# Patient Record
Sex: FEMALE | Race: WHITE | Employment: FULL TIME | URBAN - METROPOLITAN AREA
[De-identification: names, ages, dates, MRNs, and addresses within clinical notes are randomized per-mention and may not be internally consistent; named-entity substitution may affect disease eponyms.]

---

## 2017-04-17 ENCOUNTER — HOSPITAL ENCOUNTER (OUTPATIENT)
Dept: PHYSICAL THERAPY | Age: 42
Discharge: HOME | End: 2017-04-17
Attending: INTERNAL MEDICINE
Payer: COMMERCIAL

## 2017-04-17 DIAGNOSIS — M54.2 NECK PAIN: ICD-10-CM

## 2017-04-17 PROCEDURE — 97161 PT EVAL LOW COMPLEX 20 MIN: CPT

## 2017-04-17 PROCEDURE — 97110 THERAPEUTIC EXERCISES: CPT

## 2017-04-17 NOTE — PROGRESS NOTES
Mari Mario  : [facility] at [address] [place] [address]  [phone]   [phone]       OUTPATIENT PHYSICAL THERAPY:Initial Assessment and Daily Note 2017    ICD-10: Treatment Diagnosis: Cervicalgia (M54.2)   Precautions/Allergies:   Zithromax [azithromycin]   Fall Risk Score: 1 (? 5 = High Risk)  MD Orders: Eval and Treat MEDICAL/REFERRING DIAGNOSIS:  Neck pain [M54.2]   DATE OF ONSET: four weeks ago  REFERRING PHYSICIAN: Sukhdeep Sanches MD  RETURN PHYSICIAN APPOINTMENT: TBD by patient      INITIAL ASSESSMENT:  Ms. Mari Mario has attended 1 physical therapy session including initial evaluation. Mari Mario presents with S/S of increased pain, decreased ROM, decreased strength, decreased functional tolerance consistent with S/S of cervical pain. Mari Mario will benefit from home exercise program, therapeutic and postural strengthening exercises, manual therapeutic techniques (ie. Distraction, SOR, myofascial release/soft tissue mobilization) as appropriate to address Hortencia Aranda's current condition. Mari Mario will benefit from skilled PT (medically necessary) to address above deficits affecting participation in basic ADLs and overall functional tolerance. PROBLEM LIST (Impacting functional limitations):  1. Decreased Strength  2. Decreased ADL/Functional Activities  3. Decreased Transfer Abilities  4. Decreased Balance  5. Increased Pain  6. Decreased Activity Tolerance  7. Decreased Work Simplification/Energy Conservation Techniques  8. Decreased Flexibility/Joint Mobility  9. Decreased Knowledge of Precautions  10. Decreased [place] with Home Exercise Program INTERVENTIONS PLANNED:  1. Bed Mobility  2. Cold  3. Cryotherapy  4. Heat  5. Home Exercise Program (HEP)  6. Manual Therapy  7. Neuromuscular Re-education/Strengthening  8. Range of Motion (ROM)  9. Therapeutic Activites  10.  Therapeutic Exercise/Strengthening  11. Transfer Training   TREATMENT PLAN:  Effective Dates: 4/17/2017 TO 7/17/2017. Frequency/Duration: 2 times a week for 6 weeks  GOALS: (Goals have been discussed and agreed upon with patient.)  SHORT-TERM FUNCTIONAL GOALS: Time Frame: 4 weeks  1. Karly Toney will report <=4/10 pain to cervical spine with performance of functional spinal mobility and rotation. 2.  Karly Toney will demonstrate improved NDI score, indicating spinal improvement from 15/50 to 10/50 affecting minimal to no difficulty with performance of cervical mobility and strengthening. 3.  Karly Toney to be independent with initial HEP for cervical region and UE's.   4.  Karly Toney will improve ROM to >=90% to assist with improved function during instrumental activities of daily living. [facility] will improve MMT to >=4+/5 to all UE strengthening to return to PLOF and improve functional tolerance. DISCHARGE GOALS: Time Frame: 8 weeks  1. Karly Toney will report <=2/10 pain to cervical spine with performance of functional spinal mobility and rotation and minimal to no difficulty with such tasks. 2. Karly Toney will demonstrate improved NDI score, indicating spinal improvement from 10/50 to <=5/50 affecting minimal to no difficulty with performance of cervical mobility and strengthening. 3. Kalry Toney to be independent with advanced HEP for cervical region and UE's. Rehabilitation Potential For Stated Goals: Good  Regarding Hortencia Aranda's therapy, I certify that the treatment plan above will be carried out by a therapist or under their direction. Thank you for this referral,  Dima Astorga PT     Referring Physician Signature: Wesley Metcalf MD              Date                    HISTORY:   History of Present Injury/Illness (Reason for Referral):  Mrs. Renetta Rand reports onset of neck pain after waking about a month ago.   She reports that her pain has persisted since that time and led to radiating pain in the left >right shoulder blade, bilateral upper traps, and has led to nightly sleep disturbance. Her symptoms radiate to the left thumb and first two fingers and her neck pain is increased with continued work duties. Her goals are to improve her neck pain and progress to prior level of function at work and to decrease her sleep disturbance. Past Medical History/Comorbidities:   Ms. Milind Lemon  has a past medical history of Abscess in lower abdominal wall (10/25/2013); Arthritis; asthma (10/25/2013); asthma (10/25/2013); Diabetes ([address] (15years of age); Folliculitis (76/99/3486); GERD (gastroesophageal reflux disease); HTN (hypertension) (10/25/2013); hyperlipidemia (10/25/2013); Insomnia (10/25/2013); Laryngitis (10/25/2013); Left knee pain (10/25/2013); Morbid obesity ([address] Nausea & vomiting; Otitis media (10/25/2013); Palpitation (10/25/2013); Paronychia (10/25/2013); RA (rheumatoid arthritis) ([address] Rhinitis (10/25/2013); Shoulder pain, probably musculokeletal (10/25/2013); Thyroid disease; Unspecified adverse effect of anesthesia; Unspecified sleep apnea; Upper respiratory infection (10/25/2013); and Uterine fibroid. She also has no past medical history of Aneurysm ([address] Arrhythmia; Autoimmune disease ([address] CAD (coronary artery disease); Cancer ([address] Chronic kidney disease; Chronic pain; Coagulation defects; COPD; Difficult intubation; Heart failure ([address] Liver disease; Malignant hyperthermia due to anesthesia; Other ill-defined conditions; Pseudocholinesterase deficiency; Psychiatric disorder; PUD (peptic ulcer disease); Seizures ([address] Stroke [facility]); or Thromboembolus ([address] Ms. Milind Lemon  has a past surgical history that includes cholecystectomy; heent; and sinus surgery proc unlisted. Social History/Living Environment:     lives with  in private residence.    Prior Level of Function/Work/Activity:  Works as a   at [employer]    Current Medications:    Current Outpatient Prescriptions:     nabumetone (RELAFEN) 500 mg tablet, Take 1 Tab by mouth two (2) times a day., Disp: 60 Tab, Rfl: 0    cyclobenzaprine (FLEXERIL) 10 mg tablet, Take 1 Tab by mouth three (3) times daily as needed for Muscle Spasm(s). , Disp: 30 Tab, Rfl: 0    dorzolamide-timolol (COSOPT) 22.3-6.8 mg/mL ophthalmic solution, Administer 1 Drop to both eyes two (2) times a day., Disp: , Rfl:     montelukast (SINGULAIR) 10 mg tablet, TAKE ONE TABLET BY MOUTH DAILY (IN THE EVENING). , Disp: 90 Tab, Rfl: 0    insulin lispro (HUMALOG) 100 unit/mL injection, USE WITH INSULIN PUMP AS DIRECTED., Disp: 150 mL, Rfl: 0    nystatin (MYCOSTATIN) powder, Apply as directed, Disp: 60 Bottle, Rfl: 3    irbesartan (AVAPRO) 150 mg tablet, Take 1 Tab by mouth daily. , Disp: 90 Tab, Rfl: 3    atorvastatin (LIPITOR) 40 mg tablet, Take 1 Tab by mouth daily. , Disp: 90 Tab, Rfl: 3    lansoprazole (PREVACID) 30 mg capsule, Take 1 Cap by mouth Daily (before breakfast). , Disp: 90 Cap, Rfl: 3    traZODone (DESYREL) 50 mg tablet, Take 1 Tab by mouth nightly., Disp: 90 Tab, Rfl: 3    levothyroxine (SYNTHROID) 200 mcg tablet, Take 1 Tab by mouth Daily (before breakfast). , Disp: 90 Tab, Rfl: 3    levothyroxine (SYNTHROID) 50 mcg tablet, Take 1 Tab by mouth Daily (before breakfast). , Disp: 90 Tab, Rfl: 3    glucose blood VI test strips (ACCU-CHEK AMARA PLUS TEST STRP) strip, Check BS 10 Times Daily  DxE11.9, use with accu-check combo pump, Disp: 300 Strip, Rfl: 11    progesterone (PROMETRIUM) 100 mg capsule, Take 100 mg by mouth daily. , Disp: , Rfl:     fluticasone (FLONASE) 50 mcg/actuation nasal spray, 1 puff in each nostril twice daily, Disp: 1 Bottle, Rfl: 5    aspirin (ASPIRIN) 325 mg tablet, Take 325 mg by mouth daily. , Disp: , Rfl:     cyanocobalamin (VITAMIN B-12) 1,000 mcg tablet, Take 3,000 mcg by mouth daily. , Disp: , Rfl:     cholecalciferol, vitamin d3, 10,000 unit cap, Take  by mouth daily. , Disp: , Rfl:     calcium 600 mg cap, Take  by mouth daily. , Disp: , Rfl:     MULTIVITAMIN PO, Take  by mouth daily. , Disp: , Rfl:     fexofenadine (ALLEGRA) 180 mg tablet, Take 180 mg by mouth daily. Take / use AM day of surgery with a sip of water per anesthesia protocols. , Disp: , Rfl:      Date Last Reviewed:  4/17/2017   Number of Personal Factors/Comorbidities that affect the Plan of Care: 0: LOW COMPLEXITY   EXAMINATION:   Observation/Orthostatic Postural Assessment: Forward head posture, bilateral rounded shoulders, increased cervical lordosis  Palpation:          Tenderness to palpation in the cervical paraspinals, upper trapezius, and levators  ROM:    Joint: Eval Date: 4/17/2017 Re-Assess Date: Re-Assess Date:         Cervical AROM      Flexion (% of normal): 50 with tightness     Extension (% of normal): 50 with pain     L sidebending (% of normal): 50 with pain at end range     R sidebending (% of normal): 75 with tightness     L rotation (% of normal): 50 with pain at end range     R rotation (% of normal): 75 with tightness                 Pain at end-range or with AROM? Yes/No yes     Any pain with overpressure? Yes/No yes       Strength:    Joint: Eval Date:  Re-Assess Date:  Re-Assess Date:     RIGHT LEFT RIGHT LEFT RIGHT LEFT   Shoulder flexion:  5/5 4/5       Shoulder extension: 5/5 5/5       Shoulder abduction: 5/5 4/5       Shoulder IR: 5/5 5/5       Shoulder ER: 5/5 4/5       Elbow flexion: 5/5 4/5       Elbow extension: 5/5 4/5       Wrist flexion/extension: 5/5 4/5           Special Tests: Assessed @ Initial Visit    Spurling's Test: Positive     Neurological Screen: Radiating symptoms? Yes/No     Functional Mobility:  Assessed @ Initial Visit   Balance:  Assessed @ Initial Visit      Body Structures Involved:  1. Nerves  2. Bones  3. Joints  4. Muscles  5. Ligaments Body Functions Affected:  1. Sensory/Pain  2. Neuromusculoskeletal  3.  Movement Related Activities and Participation Affected:  1. General Tasks and Demands  2. Communication  3. Mobility  4. Self Care  5. Interpersonal Interactions and Relationships  6. Community, Social and [place]   Number of elements that affect the Plan of Care: 3: MODERATE COMPLEXITY   CLINICAL PRESENTATION:   Presentation: Stable and uncomplicated: LOW COMPLEXITY   CLINICAL DECISION MAKING:   Outcome Measure: Tool Used: Neck Disability Index (NDI)  Score:  Initial: 15/50  Most Recent: X/50 (Date: -- )   Interpretation of Score: The Neck Disability Index is a revised form of the Oswestry Low Back Pain Index and is designed to measure the activities of daily living in person's with neck pain. Each section is scored on a 0-5 scale, 5 representing the greatest disability. The scores of each section are added together for a total score of 50. Score 0 1-10 11-20 21-30 31-40 41-49 50   Modifier CH CI CJ CK CL CM CN       Medical Necessity:   · Skilled intervention continues to be required due to address above deficits affecting participation in basic ADLs and overall functional tolerance. Reason for Services/Other Comments:  · Patient continues to require skilled intervention due to address above deficits affecting participation in basic ADLs and overall functional tolerance. Use of outcome tool(s) and clinical judgement create a POC that gives a: Clear prediction of patient's progress: LOW COMPLEXITY   TREATMENT:   (In addition to Assessment/Re-Assessment sessions the following treatments were rendered)  THERAPEUTIC EXERCISE: (20 minutes):  Exercises per grid below to improve mobility, strength, balance and coordination. Required minimal visual and verbal cues to promote proper body alignment and promote proper body posture. Progressed resistance, range, repetitions and complexity of movement as indicated.      Date:  4/17/2017 Date:   Date:     Activity/Exercise Parameters Parameters Parameters   Supine chin tucks x5' Doorway pec stretch x5'     Sidelying trunk rotation x5'     Patient education on sleep position with set up x5'                             MANUAL THERAPY: (0 minutes): Joint mobilization, Soft tissue mobilization and Manipulation was utilized and necessary because of the patient's restricted joint motion, painful spasm, loss of articular motion and restricted motion of soft tissue. MODALITIES: ( minutes):    (Used abbreviations: MET - muscle energy technique; PNF - proprioceptive neuromuscular facilitation; NMR - neuromuscular re-education; AP - anterior to posterior; PA - posterior to anterior)    Pre-Treatment Assessment:See Patient history  Treatment/Session Assessment:Patient had improved pain with ROM. · Pain/ Symptoms: Initial:   7 Post Session:  4 ·   Compliance with Program/Exercises: Will assess as treatment progresses. · Recommendations/Intent for next treatment session: \"Next visit will focus on advancements to more challenging activities and reduction in assistance provided\".   Total Treatment Duration:  PT Patient Time In/Time Out  Time In: 1400  Time Out: [facility],

## 2017-04-24 ENCOUNTER — HOSPITAL ENCOUNTER (OUTPATIENT)
Dept: PHYSICAL THERAPY | Age: 42
Discharge: HOME | End: 2017-04-24
Attending: INTERNAL MEDICINE
Payer: COMMERCIAL

## 2017-04-24 PROCEDURE — 97110 THERAPEUTIC EXERCISES: CPT

## 2017-04-24 PROCEDURE — 97140 MANUAL THERAPY 1/> REGIONS: CPT

## 2017-04-24 NOTE — PROGRESS NOTES
Kaycee Davis  : 1975 [facility] [address]  [phone]   [phone]       OUTPATIENT PHYSICAL THERAPY: Daily Note 2017    ICD-10: Treatment Diagnosis: Cervicalgia (M54.2)   Precautions/Allergies:   Zithromax [azithromycin]   Fall Risk Score: 1 (? 5 = High Risk)  MD Orders: Eval and Treat MEDICAL/REFERRING DIAGNOSIS:  Neck pain [M54.2]   DATE OF ONSET: four weeks ago  REFERRING PHYSICIAN: Angelica Haile MD  RETURN PHYSICIAN APPOINTMENT: TBD by patient      INITIAL ASSESSMENT:  Ms. Kaycee Davis has attended 1 physical therapy session including initial evaluation. Kaycee Davis presents with S/S of increased pain, decreased ROM, decreased strength, decreased functional tolerance consistent with S/S of cervical pain. Kaycee Davis will benefit from home exercise program, therapeutic and postural strengthening exercises, manual therapeutic techniques (ie. Distraction, SOR, myofascial release/soft tissue mobilization) as appropriate to address Hortencia Aranda's current condition. Kaycee Davis will benefit from skilled PT (medically necessary) to address above deficits affecting participation in basic ADLs and overall functional tolerance. PROBLEM LIST (Impacting functional limitations):  1. Decreased Strength  2. Decreased ADL/Functional Activities  3. Decreased Transfer Abilities  4. Decreased Balance  5. Increased Pain  6. Decreased Activity Tolerance  7. Decreased Work Simplification/Energy Conservation Techniques  8. Decreased Flexibility/Joint Mobility  9. Decreased Knowledge of Precautions  10. Decreased [place] with Home Exercise Program INTERVENTIONS PLANNED:  1. Bed Mobility  2. Cold  3. Cryotherapy  4. Heat  5. Home Exercise Program (HEP)  6. Manual Therapy  7. Neuromuscular Re-education/Strengthening  8. Range of Motion (ROM)  9. Therapeutic Activites  10.  Therapeutic Exercise/Strengthening  11. Transfer Training   TREATMENT PLAN:  Effective Dates: 4/17/2017 TO 7/17/2017. Frequency/Duration: 2 times a week for 6 weeks  GOALS: (Goals have been discussed and agreed upon with patient.)  SHORT-TERM FUNCTIONAL GOALS: Time Frame: 4 weeks  1. Mk Vazquez will report <=4/10 pain to cervical spine with performance of functional spinal mobility and rotation. 2.  Mk Colen will demonstrate improved NDI score, indicating spinal improvement from 15/50 to 10/50 affecting minimal to no difficulty with performance of cervical mobility and strengthening. 3.  Job Kelseyn to be independent with initial HEP for cervical region and UE's.   4.  Mk Colen will improve ROM to >=90% to assist with improved function during instrumental activities of daily living. [facility] will improve MMT to >=4+/5 to all UE strengthening to return to PLOF and improve functional tolerance. DISCHARGE GOALS: Time Frame: 8 weeks  1. Mk Colen will report <=2/10 pain to cervical spine with performance of functional spinal mobility and rotation and minimal to no difficulty with such tasks. 2. Mk Colen will demonstrate improved NDI score, indicating spinal improvement from 10/50 to <=5/50 affecting minimal to no difficulty with performance of cervical mobility and strengthening. 3. Mk Colen to be independent with advanced HEP for cervical region and UE's. Rehabilitation Potential For Stated Goals: Good  Regarding Hortencia Aranda's therapy, I certify that the treatment plan above will be carried out by a therapist or under their direction. Thank you for this referral,  Lc Fall PT     Referring Physician Signature: Kd Pappas MD              Date                    HISTORY:   History of Present Injury/Illness (Reason for Referral):  Mrs. Alvarado reports onset of neck pain after waking about a month ago.   She reports that her pain has persisted since that time and led to radiating pain in the left >right shoulder blade, bilateral upper traps, and has led to nightly sleep disturbance. Her symptoms radiate to the left thumb and first two fingers and her neck pain is increased with continued work duties. Her goals are to improve her neck pain and progress to prior level of function at work and to decrease her sleep disturbance. Past Medical History/Comorbidities:   Ms. Jaspreet Hay  has a past medical history of Abscess in lower abdominal wall (10/25/2013); Arthritis; asthma (10/25/2013); asthma (10/25/2013); Diabetes ([address] (15years of age); Folliculitis (94/49/8581); GERD (gastroesophageal reflux disease); HTN (hypertension) (10/25/2013); hyperlipidemia (10/25/2013); Insomnia (10/25/2013); Laryngitis (10/25/2013); Left knee pain (10/25/2013); Morbid obesity ([address] Nausea & vomiting; Otitis media (10/25/2013); Palpitation (10/25/2013); Paronychia (10/25/2013); RA (rheumatoid arthritis) ([address] Rhinitis (10/25/2013); Shoulder pain, probably musculokeletal (10/25/2013); Thyroid disease; Unspecified adverse effect of anesthesia; Unspecified sleep apnea; Upper respiratory infection (10/25/2013); and Uterine fibroid. She also has no past medical history of Aneurysm ([address] Arrhythmia; Autoimmune disease ([address] CAD (coronary artery disease); Cancer ([address] Chronic kidney disease; Chronic pain; Coagulation defects; COPD; Difficult intubation; Heart failure ([address] Liver disease; Malignant hyperthermia due to anesthesia; Other ill-defined conditions; Pseudocholinesterase deficiency; Psychiatric disorder; PUD (peptic ulcer disease); Seizures ([address] Stroke [facility]); or Thromboembolus ([address] Ms. Jaspreet Hay  has a past surgical history that includes cholecystectomy; heent; and sinus surgery proc unlisted. Social History/Living Environment:     lives with  in private residence.    Prior Level of Function/Work/Activity:  Works as a   at [employer]    Current Medications:    Current Outpatient Prescriptions:     nabumetone (RELAFEN) 500 mg tablet, Take 1 Tab by mouth two (2) times a day., Disp: 60 Tab, Rfl: 0    cyclobenzaprine (FLEXERIL) 10 mg tablet, Take 1 Tab by mouth three (3) times daily as needed for Muscle Spasm(s). , Disp: 30 Tab, Rfl: 0    dorzolamide-timolol (COSOPT) 22.3-6.8 mg/mL ophthalmic solution, Administer 1 Drop to both eyes two (2) times a day., Disp: , Rfl:     montelukast (SINGULAIR) 10 mg tablet, TAKE ONE TABLET BY MOUTH DAILY (IN THE EVENING). , Disp: 90 Tab, Rfl: 0    insulin lispro (HUMALOG) 100 unit/mL injection, USE WITH INSULIN PUMP AS DIRECTED., Disp: 150 mL, Rfl: 0    nystatin (MYCOSTATIN) powder, Apply as directed, Disp: 60 Bottle, Rfl: 3    irbesartan (AVAPRO) 150 mg tablet, Take 1 Tab by mouth daily. , Disp: 90 Tab, Rfl: 3    atorvastatin (LIPITOR) 40 mg tablet, Take 1 Tab by mouth daily. , Disp: 90 Tab, Rfl: 3    lansoprazole (PREVACID) 30 mg capsule, Take 1 Cap by mouth Daily (before breakfast). , Disp: 90 Cap, Rfl: 3    traZODone (DESYREL) 50 mg tablet, Take 1 Tab by mouth nightly., Disp: 90 Tab, Rfl: 3    levothyroxine (SYNTHROID) 200 mcg tablet, Take 1 Tab by mouth Daily (before breakfast). , Disp: 90 Tab, Rfl: 3    levothyroxine (SYNTHROID) 50 mcg tablet, Take 1 Tab by mouth Daily (before breakfast). , Disp: 90 Tab, Rfl: 3    glucose blood VI test strips (ACCU-CHEK AMARA PLUS TEST STRP) strip, Check BS 10 Times Daily  DxE11.9, use with accu-check combo pump, Disp: 300 Strip, Rfl: 11    progesterone (PROMETRIUM) 100 mg capsule, Take 100 mg by mouth daily. , Disp: , Rfl:     fluticasone (FLONASE) 50 mcg/actuation nasal spray, 1 puff in each nostril twice daily, Disp: 1 Bottle, Rfl: 5    aspirin (ASPIRIN) 325 mg tablet, Take 325 mg by mouth daily. , Disp: , Rfl:     cyanocobalamin (VITAMIN B-12) 1,000 mcg tablet, Take 3,000 mcg by mouth daily. , Disp: , Rfl:     cholecalciferol, vitamin d3, 10,000 unit cap, Take  by mouth daily. , Disp: , Rfl:     calcium 600 mg cap, Take  by mouth daily. , Disp: , Rfl:     MULTIVITAMIN PO, Take  by mouth daily. , Disp: , Rfl:     fexofenadine (ALLEGRA) 180 mg tablet, Take 180 mg by mouth daily. Take / use AM day of surgery with a sip of water per anesthesia protocols. , Disp: , Rfl:      Date Last Reviewed:  4/24/2017   Number of Personal Factors/Comorbidities that affect the Plan of Care: 0: LOW COMPLEXITY   EXAMINATION:   Observation/Orthostatic Postural Assessment: Forward head posture, bilateral rounded shoulders, increased cervical lordosis  Palpation:          Tenderness to palpation in the cervical paraspinals, upper trapezius, and levators  ROM:    Joint: Eval Date: 4/17/2017 Re-Assess Date: Re-Assess Date:         Cervical AROM      Flexion (% of normal): 50 with tightness     Extension (% of normal): 50 with pain     L sidebending (% of normal): 50 with pain at end range     R sidebending (% of normal): 75 with tightness     L rotation (% of normal): 50 with pain at end range     R rotation (% of normal): 75 with tightness                 Pain at end-range or with AROM? Yes/No yes     Any pain with overpressure? Yes/No yes       Strength:    Joint: Eval Date:  Re-Assess Date:  Re-Assess Date:     RIGHT LEFT RIGHT LEFT RIGHT LEFT   Shoulder flexion:  5/5 4/5       Shoulder extension: 5/5 5/5       Shoulder abduction: 5/5 4/5       Shoulder IR: 5/5 5/5       Shoulder ER: 5/5 4/5       Elbow flexion: 5/5 4/5       Elbow extension: 5/5 4/5       Wrist flexion/extension: 5/5 4/5           Special Tests: Assessed @ Initial Visit    Spurling's Test: Positive     Neurological Screen: Radiating symptoms? Yes/No     Functional Mobility:  Assessed @ Initial Visit   Balance:  Assessed @ Initial Visit      Body Structures Involved:  1. Nerves  2. Bones  3. Joints  4. Muscles  5. Ligaments Body Functions Affected:  1. Sensory/Pain  2. Neuromusculoskeletal  3.  Movement Related Activities and Participation Affected:  1. General Tasks and Demands  2. Communication  3. Mobility  4. Self Care  5. Interpersonal Interactions and Relationships  6. Community, Social and [place]   Number of elements that affect the Plan of Care: 3: MODERATE COMPLEXITY   CLINICAL PRESENTATION:   Presentation: Stable and uncomplicated: LOW COMPLEXITY   CLINICAL DECISION MAKING:   Outcome Measure: Tool Used: Neck Disability Index (NDI)  Score:  Initial: 15/50  Most Recent: X/50 (Date: -- )   Interpretation of Score: The Neck Disability Index is a revised form of the Oswestry Low Back Pain Index and is designed to measure the activities of daily living in person's with neck pain. Each section is scored on a 0-5 scale, 5 representing the greatest disability. The scores of each section are added together for a total score of 50. Score 0 1-10 11-20 21-30 31-40 41-49 50   Modifier CH CI CJ CK CL CM CN       Medical Necessity:   · Skilled intervention continues to be required due to address above deficits affecting participation in basic ADLs and overall functional tolerance. Reason for Services/Other Comments:  · Patient continues to require skilled intervention due to address above deficits affecting participation in basic ADLs and overall functional tolerance. Use of outcome tool(s) and clinical judgement create a POC that gives a: Clear prediction of patient's progress: LOW COMPLEXITY   TREATMENT:   (In addition to Assessment/Re-Assessment sessions the following treatments were rendered)  THERAPEUTIC EXERCISE: (20 minutes):  Exercises per grid below to improve mobility, strength, balance and coordination. Required minimal visual and verbal cues to promote proper body alignment and promote proper body posture. Progressed resistance, range, repetitions and complexity of movement as indicated.      Date:  4/17/2017 Date:  4/24/2017 Date:     Activity/Exercise Parameters Parameters Parameters   Supine chin tucks x5' With cuff feedback x10'    Doorway pec stretch x5' x5'    Sidelying trunk rotation x5' x5'    Patient education on sleep position with set up x5'                             MANUAL THERAPY: (25 minutes): Joint mobilization, Soft tissue mobilization and Manipulation was utilized and necessary because of the patient's restricted joint motion, painful spasm, loss of articular motion and restricted motion of soft tissue. Soft tissue mobilization to bilateral upper traps and levators. Manual cervical distraction in supine. P/a mobilization to LC2-3, T1-2, T2-3, T3-4 grade III, IV-each level  MODALITIES: ( minutes):    (Used abbreviations: MET - muscle energy technique; PNF - proprioceptive neuromuscular facilitation; NMR - neuromuscular re-education; AP - anterior to posterior; PA - posterior to anterior)    Pre-Treatment Assessment: Reports improved symptoms when performing HEP. Treatment/Session Assessment:Patient had improved range and pain with cervical spine ROM after treatment. · Pain/ Symptoms: Initial:   5 Post Session:  3 ·   Compliance with Program/Exercises: Will assess as treatment progresses. · Recommendations/Intent for next treatment session: \"Next visit will focus on advancements to more challenging activities and reduction in assistance provided\".   Total Treatment Duration:  PT Patient Time In/Time Out  Time In: 1550  Time Out: [address], PT

## 2017-04-24 NOTE — PROGRESS NOTES
Ambulatory/Rehab Services H2 Model Falls Risk Assessment    Risk Factor Pts. ·   Confusion/Disorientation/Impulsivity  []    4 ·   Symptomatic Depression  []   2 ·   Altered Elimination  []   1 ·   Dizziness/Vertigo  []   1 ·   Gender (Male)  []   1 ·   Any administered antiepileptics (anticonvulsants):  []   2 ·   Any administered benzodiazepines:  []   1 ·   Visual Impairment (specify):  []   1 ·   Portable Oxygen Use  []   1 ·   Orthostatic ? BP  []   1 ·   History of Recent Falls (within 3 mos.)  []   5     Ability to Rise from Chair (choose one) Pts. ·   Ability to rise in a single movement  []   0 ·   Pushes up, successful in one attempt  [x]   1 ·   Multiple attempts, but successful  []   3 ·   Unable to rise without assistance  []   4   Total: (5 or greater = High Risk) 1     Falls Prevention Plan:   []                Physical Limitations to Exercise (specify):   []                Mobility Assistance Device (type):   []                Exercise/Equipment Adaptation (specify):    ©2010 [facility] of [address] Patent #[id number].  Federal Law prohibits the replication, distribution or use without written permission from [facility] ([facility])

## 2017-04-27 ENCOUNTER — HOSPITAL ENCOUNTER (OUTPATIENT)
Dept: PHYSICAL THERAPY | Age: 42
Discharge: HOME | End: 2017-04-27
Attending: INTERNAL MEDICINE
Payer: COMMERCIAL

## 2017-04-27 PROCEDURE — 97140 MANUAL THERAPY 1/> REGIONS: CPT

## 2017-04-27 PROCEDURE — 97110 THERAPEUTIC EXERCISES: CPT

## 2017-04-27 NOTE — PROGRESS NOTES
Cyn Newman  : [facility] [address]  [phone]   [phone]       OUTPATIENT PHYSICAL THERAPY: Daily Note 2017    ICD-10: Treatment Diagnosis: Cervicalgia (M54.2)   Precautions/Allergies:   Zithromax [azithromycin]   Fall Risk Score: 1 (? 5 = High Risk)  MD Orders: Eval and Treat MEDICAL/REFERRING DIAGNOSIS:  Neck pain [M54.2]   DATE OF ONSET: four weeks ago  REFERRING PHYSICIAN: Bernard Kaba MD  RETURN PHYSICIAN APPOINTMENT: TBD by patient      INITIAL ASSESSMENT:  Ms. Cyn Newman has attended 1 physical therapy session including initial evaluation. Cyn Newman presents with S/S of increased pain, decreased ROM, decreased strength, decreased functional tolerance consistent with S/S of cervical pain. Cyn Newman will benefit from home exercise program, therapeutic and postural strengthening exercises, manual therapeutic techniques (ie. Distraction, SOR, myofascial release/soft tissue mobilization) as appropriate to address Hortencia Aranda's current condition. Cyn Newman will benefit from skilled PT (medically necessary) to address above deficits affecting participation in basic ADLs and overall functional tolerance. PROBLEM LIST (Impacting functional limitations):  1. Decreased Strength  2. Decreased ADL/Functional Activities  3. Decreased Transfer Abilities  4. Decreased Balance  5. Increased Pain  6. Decreased Activity Tolerance  7. Decreased Work Simplification/Energy Conservation Techniques  8. Decreased Flexibility/Joint Mobility  9. Decreased Knowledge of Precautions  10. Decreased [place] with Home Exercise Program INTERVENTIONS PLANNED:  1. Bed Mobility  2. Cold  3. Cryotherapy  4. Heat  5. Home Exercise Program (HEP)  6. Manual Therapy  7. Neuromuscular Re-education/Strengthening  8. Range of Motion (ROM)  9. Therapeutic Activites  10.  Therapeutic Exercise/Strengthening  11. Transfer Training   TREATMENT PLAN:  Effective Dates: 4/17/2017 TO 7/17/2017. Frequency/Duration: 2 times a week for 6 weeks  GOALS: (Goals have been discussed and agreed upon with patient.)  SHORT-TERM FUNCTIONAL GOALS: Time Frame: 4 weeks  1. Justa Varela will report <=4/10 pain to cervical spine with performance of functional spinal mobility and rotation. 2.  Justa Varela will demonstrate improved NDI score, indicating spinal improvement from 15/50 to 10/50 affecting minimal to no difficulty with performance of cervical mobility and strengthening. 3.  Justa Varela to be independent with initial HEP for cervical region and UE's.   4.  Justa Varela will improve ROM to >=90% to assist with improved function during instrumental activities of daily living. [address] [facility] [address] will improve MMT to >=4+/5 to all UE strengthening to return to PLOF and improve functional tolerance. DISCHARGE GOALS: Time Frame: 8 weeks  1. Justa Varela will report <=2/10 pain to cervical spine with performance of functional spinal mobility and rotation and minimal to no difficulty with such tasks. 2. Justa Varela will demonstrate improved NDI score, indicating spinal improvement from 10/50 to <=5/50 affecting minimal to no difficulty with performance of cervical mobility and strengthening. 3. Justa Varela to be independent with advanced HEP for cervical region and UE's. Rehabilitation Potential For Stated Goals: Good  Regarding Hortencia Aranda's therapy, I certify that the treatment plan above will be carried out by a therapist or under their direction. Thank you for this referral,  [facility],      Referring Physician Signature: Jameson Naranjo MD              Date                    HISTORY:   History of Present Injury/Illness (Reason for Referral):  Mrs. Claudean Fries reports onset of neck pain after waking about a month ago.   She reports that her pain has persisted since that time and led to radiating pain in the left >right shoulder blade, bilateral upper traps, and has led to nightly sleep disturbance. Her symptoms radiate to the left thumb and first two fingers and her neck pain is increased with continued work duties. Her goals are to improve her neck pain and progress to prior level of function at work and to decrease her sleep disturbance. Past Medical History/Comorbidities:   Ms. Yuriy Leung  has a past medical history of Abscess in lower abdominal wall (10/25/2013); Arthritis; asthma (10/25/2013); asthma (10/25/2013); Diabetes ([address] (15years of age); Folliculitis (22/71/3235); GERD (gastroesophageal reflux disease); HTN (hypertension) (10/25/2013); hyperlipidemia (10/25/2013); Insomnia (10/25/2013); Laryngitis (10/25/2013); Left knee pain (10/25/2013); Morbid obesity ([address] Nausea & vomiting; Otitis media (10/25/2013); Palpitation (10/25/2013); Paronychia (10/25/2013); RA (rheumatoid arthritis) ([address] Rhinitis (10/25/2013); Shoulder pain, probably musculokeletal (10/25/2013); Thyroid disease; Unspecified adverse effect of anesthesia; Unspecified sleep apnea; Upper respiratory infection (10/25/2013); and Uterine fibroid. She also has no past medical history of Aneurysm ([address] Arrhythmia; Autoimmune disease ([address] CAD (coronary artery disease); Cancer ([address] Chronic kidney disease; Chronic pain; Coagulation defects; COPD; Difficult intubation; Heart failure ([address] Liver disease; Malignant hyperthermia due to anesthesia; Other ill-defined conditions; Pseudocholinesterase deficiency; Psychiatric disorder; PUD (peptic ulcer disease); Seizures ([address] Stroke [facility]); or Thromboembolus ([address] Ms. Yuriy Leung  has a past surgical history that includes cholecystectomy; heent; and sinus surgery proc unlisted. Social History/Living Environment:     lives with  in private residence.    Prior Level of Function/Work/Activity:  Works as a   at [employer]    Current Medications:    Current Outpatient Prescriptions:     nabumetone (RELAFEN) 500 mg tablet, Take 1 Tab by mouth two (2) times a day., Disp: 60 Tab, Rfl: 0    cyclobenzaprine (FLEXERIL) 10 mg tablet, Take 1 Tab by mouth three (3) times daily as needed for Muscle Spasm(s). , Disp: 30 Tab, Rfl: 0    dorzolamide-timolol (COSOPT) 22.3-6.8 mg/mL ophthalmic solution, Administer 1 Drop to both eyes two (2) times a day., Disp: , Rfl:     montelukast (SINGULAIR) 10 mg tablet, TAKE ONE TABLET BY MOUTH DAILY (IN THE EVENING). , Disp: 90 Tab, Rfl: 0    insulin lispro (HUMALOG) 100 unit/mL injection, USE WITH INSULIN PUMP AS DIRECTED., Disp: 150 mL, Rfl: 0    nystatin (MYCOSTATIN) powder, Apply as directed, Disp: 60 Bottle, Rfl: 3    irbesartan (AVAPRO) 150 mg tablet, Take 1 Tab by mouth daily. , Disp: 90 Tab, Rfl: 3    atorvastatin (LIPITOR) 40 mg tablet, Take 1 Tab by mouth daily. , Disp: 90 Tab, Rfl: 3    lansoprazole (PREVACID) 30 mg capsule, Take 1 Cap by mouth Daily (before breakfast). , Disp: 90 Cap, Rfl: 3    traZODone (DESYREL) 50 mg tablet, Take 1 Tab by mouth nightly., Disp: 90 Tab, Rfl: 3    levothyroxine (SYNTHROID) 200 mcg tablet, Take 1 Tab by mouth Daily (before breakfast). , Disp: 90 Tab, Rfl: 3    levothyroxine (SYNTHROID) 50 mcg tablet, Take 1 Tab by mouth Daily (before breakfast). , Disp: 90 Tab, Rfl: 3    glucose blood VI test strips (ACCU-CHEK AMARA PLUS TEST STRP) strip, Check BS 10 Times Daily  DxE11.9, use with accu-check combo pump, Disp: 300 Strip, Rfl: 11    progesterone (PROMETRIUM) 100 mg capsule, Take 100 mg by mouth daily. , Disp: , Rfl:     fluticasone (FLONASE) 50 mcg/actuation nasal spray, 1 puff in each nostril twice daily, Disp: 1 Bottle, Rfl: 5    aspirin (ASPIRIN) 325 mg tablet, Take 325 mg by mouth daily. , Disp: , Rfl:     cyanocobalamin (VITAMIN B-12) 1,000 mcg tablet, Take 3,000 mcg by mouth daily. , Disp: , Rfl:     cholecalciferol, vitamin d3, 10,000 unit cap, Take  by mouth daily. , Disp: , Rfl:     calcium 600 mg cap, Take  by mouth daily. , Disp: , Rfl:     MULTIVITAMIN PO, Take  by mouth daily. , Disp: , Rfl:     fexofenadine (ALLEGRA) 180 mg tablet, Take 180 mg by mouth daily. Take / use AM day of surgery with a sip of water per anesthesia protocols. , Disp: , Rfl:      Date Last Reviewed:  4/27/2017   Number of Personal Factors/Comorbidities that affect the Plan of Care: 0: LOW COMPLEXITY   EXAMINATION:   Observation/Orthostatic Postural Assessment: Forward head posture, bilateral rounded shoulders, increased cervical lordosis  Palpation:          Tenderness to palpation in the cervical paraspinals, upper trapezius, and levators  ROM:    Joint: Eval Date: 4/17/2017 Re-Assess Date: Re-Assess Date:         Cervical AROM      Flexion (% of normal): 50 with tightness     Extension (% of normal): 50 with pain     L sidebending (% of normal): 50 with pain at end range     R sidebending (% of normal): 75 with tightness     L rotation (% of normal): 50 with pain at end range     R rotation (% of normal): 75 with tightness                 Pain at end-range or with AROM? Yes/No yes     Any pain with overpressure? Yes/No yes       Strength:    Joint: Eval Date:  Re-Assess Date:  Re-Assess Date:     RIGHT LEFT RIGHT LEFT RIGHT LEFT   Shoulder flexion:  5/5 4/5       Shoulder extension: 5/5 5/5       Shoulder abduction: 5/5 4/5       Shoulder IR: 5/5 5/5       Shoulder ER: 5/5 4/5       Elbow flexion: 5/5 4/5       Elbow extension: 5/5 4/5       Wrist flexion/extension: 5/5 4/5           Special Tests: Assessed @ Initial Visit    Spurling's Test: Positive     Neurological Screen: Radiating symptoms? Yes/No     Functional Mobility:  Assessed @ Initial Visit   Balance:  Assessed @ Initial Visit      Body Structures Involved:  1. Nerves  2. Bones  3. Joints  4. Muscles  5. Ligaments Body Functions Affected:  1. Sensory/Pain  2. Neuromusculoskeletal  3.  Movement Related Activities and Participation Affected:  1. General Tasks and Demands  2. Communication  3. Mobility  4. Self Care  5. Interpersonal Interactions and Relationships  6. Community, Social and [place]   Number of elements that affect the Plan of Care: 3: MODERATE COMPLEXITY   CLINICAL PRESENTATION:   Presentation: Stable and uncomplicated: LOW COMPLEXITY   CLINICAL DECISION MAKING:   Outcome Measure: Tool Used: Neck Disability Index (NDI)  Score:  Initial: 15/50  Most Recent: X/50 (Date: -- )   Interpretation of Score: The Neck Disability Index is a revised form of the Oswestry Low Back Pain Index and is designed to measure the activities of daily living in person's with neck pain. Each section is scored on a 0-5 scale, 5 representing the greatest disability. The scores of each section are added together for a total score of 50. Score 0 1-10 11-20 21-30 31-40 41-49 50   Modifier CH CI CJ CK CL CM CN       Medical Necessity:   · Skilled intervention continues to be required due to address above deficits affecting participation in basic ADLs and overall functional tolerance. Reason for Services/Other Comments:  · Patient continues to require skilled intervention due to address above deficits affecting participation in basic ADLs and overall functional tolerance. Use of outcome tool(s) and clinical judgement create a POC that gives a: Clear prediction of patient's progress: LOW COMPLEXITY   TREATMENT:   (In addition to Assessment/Re-Assessment sessions the following treatments were rendered)  THERAPEUTIC EXERCISE: (16 minutes):  Exercises per grid below to improve mobility, strength, balance and coordination. Required minimal visual and verbal cues to promote proper body alignment and promote proper body posture. Progressed resistance, range, repetitions and complexity of movement as indicated.      Date:  4/17/2017 Date:  4/24/2017 Date:  4/27/2017   Activity/Exercise Parameters Parameters Parameters   Supine chin tucks x5' With cuff feedback x10' x10'   Doorway pec stretch x5' x5'    Sidelying trunk rotation x5' x5'    Patient education on sleep position with set up x5'     ube   3'/3'                     MANUAL THERAPY: (25 minutes): Joint mobilization, Soft tissue mobilization and Manipulation was utilized and necessary because of the patient's restricted joint motion, painful spasm, loss of articular motion and restricted motion of soft tissue. Soft tissue mobilization to bilateral upper traps and levators. Manual cervical distraction in supine. P/a mobilization to LC2-3, T1-2, T2-3, T3-4 grade III, IV-each level  MODALITIES: ( minutes):    (Used abbreviations: MET - muscle energy technique; PNF - proprioceptive neuromuscular facilitation; NMR - neuromuscular re-education; AP - anterior to posterior; PA - posterior to anterior)    Pre-Treatment Assessment: Reports improved symptoms when performing HEP and after last visit. Treatment/Session Assessment:Patient had improved range and pain with cervical spine ROM after treatment. · Pain/ Symptoms: Initial:   5 Post Session:  3 ·   Compliance with Program/Exercises: Will assess as treatment progresses. · Recommendations/Intent for next treatment session: \"Next visit will focus on advancements to more challenging activities and reduction in assistance provided\".   Total Treatment Duration:  PT Patient Time In/Time Out  Time In: 1615  Time Out: 1160 [facility], PT

## 2017-05-02 ENCOUNTER — HOSPITAL ENCOUNTER (OUTPATIENT)
Dept: PHYSICAL THERAPY | Age: 42
Discharge: HOME | End: 2017-05-02
Attending: INTERNAL MEDICINE
Payer: COMMERCIAL

## 2017-05-02 PROCEDURE — 97140 MANUAL THERAPY 1/> REGIONS: CPT

## 2017-05-02 PROCEDURE — 97110 THERAPEUTIC EXERCISES: CPT

## 2017-05-03 ENCOUNTER — HOSPITAL ENCOUNTER (OUTPATIENT)
Dept: PHYSICAL THERAPY | Age: 42
Discharge: HOME | End: 2017-05-03
Attending: INTERNAL MEDICINE
Payer: COMMERCIAL

## 2017-05-03 PROCEDURE — 97110 THERAPEUTIC EXERCISES: CPT

## 2017-05-03 PROCEDURE — 97140 MANUAL THERAPY 1/> REGIONS: CPT

## 2017-05-03 NOTE — PROGRESS NOTES
Dada Preston  : 1975 [facility] [address]  [phone]   [phone]       OUTPATIENT PHYSICAL THERAPY: Daily Note 5/3/2017    ICD-10: Treatment Diagnosis: Cervicalgia (M54.2)   Precautions/Allergies:   Zithromax [azithromycin]   Fall Risk Score: 1 (? 5 = High Risk)  MD Orders: Eval and Treat MEDICAL/REFERRING DIAGNOSIS:  Neck pain [M54.2]   DATE OF ONSET: four weeks ago  REFERRING PHYSICIAN: Michael Avalos MD  RETURN PHYSICIAN APPOINTMENT: TBD by patient      INITIAL ASSESSMENT:  Ms. Dada Preston has attended 1 physical therapy session including initial evaluation. Dada Preston presents with S/S of increased pain, decreased ROM, decreased strength, decreased functional tolerance consistent with S/S of cervical pain. Dada Preston will benefit from home exercise program, therapeutic and postural strengthening exercises, manual therapeutic techniques (ie. Distraction, SOR, myofascial release/soft tissue mobilization) as appropriate to address Hortencia Aranda's current condition. Dada Preston will benefit from skilled PT (medically necessary) to address above deficits affecting participation in basic ADLs and overall functional tolerance. PROBLEM LIST (Impacting functional limitations):  1. Decreased Strength  2. Decreased ADL/Functional Activities  3. Decreased Transfer Abilities  4. Decreased Balance  5. Increased Pain  6. Decreased Activity Tolerance  7. Decreased Work Simplification/Energy Conservation Techniques  8. Decreased Flexibility/Joint Mobility  9. Decreased Knowledge of Precautions  10. Decreased [place] with Home Exercise Program INTERVENTIONS PLANNED:  1. Bed Mobility  2. Cold  3. Cryotherapy  4. Heat  5. Home Exercise Program (HEP)  6. Manual Therapy  7. Neuromuscular Re-education/Strengthening  8. Range of Motion (ROM)  9. Therapeutic Activites  10.  Therapeutic Exercise/Strengthening  11. Transfer Training   TREATMENT PLAN:  Effective Dates: 4/17/2017 TO 7/17/2017. Frequency/Duration: 2 times a week for 6 weeks  GOALS: (Goals have been discussed and agreed upon with patient.)  SHORT-TERM FUNCTIONAL GOALS: Time Frame: 4 weeks  1. Avis Reyez will report <=4/10 pain to cervical spine with performance of functional spinal mobility and rotation. 2.  Avis Reyez will demonstrate improved NDI score, indicating spinal improvement from 15/50 to 10/50 affecting minimal to no difficulty with performance of cervical mobility and strengthening. 3.  Avis Reyez to be independent with initial HEP for cervical region and UE's.   4.  Avis Reyez will improve ROM to >=90% to assist with improved function during instrumental activities of daily living. [address] will improve MMT to >=4+/5 to all UE strengthening to return to PLOF and improve functional tolerance. DISCHARGE GOALS: Time Frame: 8 weeks  1. Avis Reyez will report <=2/10 pain to cervical spine with performance of functional spinal mobility and rotation and minimal to no difficulty with such tasks. 2. Avis Reyez will demonstrate improved NDI score, indicating spinal improvement from 10/50 to <=5/50 affecting minimal to no difficulty with performance of cervical mobility and strengthening. 3. Avis Reyez to be independent with advanced HEP for cervical region and UE's. Rehabilitation Potential For Stated Goals: Good  Regarding Hortencia Aranda's therapy, I certify that the treatment plan above will be carried out by a therapist or under their direction. Thank you for this referral,  Sofia Morales PT     Referring Physician Signature: Jean Paul Molina MD              Date                    HISTORY:     History of Present Injury/Illness (Reason for Referral):  Mrs. Brett Babcock reports onset of neck pain after waking about a month ago.   She reports that her pain has persisted since that time and led to radiating pain in the left >right shoulder blade, bilateral upper traps, and has led to nightly sleep disturbance. Her symptoms radiate to the left thumb and first two fingers and her neck pain is increased with continued work duties. Her goals are to improve her neck pain and progress to prior level of function at work and to decrease her sleep disturbance. Past Medical History/Comorbidities:   Ms. Renetta Rand  has a past medical history of Abscess in lower abdominal wall (10/25/2013); Arthritis; asthma (10/25/2013); asthma (10/25/2013); Diabetes ([address] (15years of age); Folliculitis (75/47/5646); GERD (gastroesophageal reflux disease); HTN (hypertension) (10/25/2013); hyperlipidemia (10/25/2013); Insomnia (10/25/2013); Laryngitis (10/25/2013); Left knee pain (10/25/2013); Morbid obesity ([address] Nausea & vomiting; Otitis media (10/25/2013); Palpitation (10/25/2013); Paronychia (10/25/2013); RA (rheumatoid arthritis) ([address] Rhinitis (10/25/2013); Shoulder pain, probably musculokeletal (10/25/2013); Thyroid disease; Unspecified adverse effect of anesthesia; Unspecified sleep apnea; Upper respiratory infection (10/25/2013); and Uterine fibroid. She also has no past medical history of Aneurysm ([address] Arrhythmia; Autoimmune disease ([address] CAD (coronary artery disease); Cancer ([address] Chronic kidney disease; Chronic pain; Coagulation defects; COPD; Difficult intubation; Heart failure ([address] Liver disease; Malignant hyperthermia due to anesthesia; Other ill-defined conditions; Pseudocholinesterase deficiency; Psychiatric disorder; PUD (peptic ulcer disease); Seizures ([address] Stroke [facility]); or Thromboembolus ([address] Ms. Renetta Rand  has a past surgical history that includes cholecystectomy; heent; and sinus surgery proc unlisted. Social History/Living Environment:     lives with  in private residence.    Prior Level of Function/Work/Activity:  Works as a   at [employer]    Current Medications:    Current Outpatient Prescriptions:     nabumetone (RELAFEN) 500 mg tablet, Take 1 Tab by mouth two (2) times a day., Disp: 60 Tab, Rfl: 0    cyclobenzaprine (FLEXERIL) 10 mg tablet, Take 1 Tab by mouth three (3) times daily as needed for Muscle Spasm(s). , Disp: 30 Tab, Rfl: 0    dorzolamide-timolol (COSOPT) 22.3-6.8 mg/mL ophthalmic solution, Administer 1 Drop to both eyes two (2) times a day., Disp: , Rfl:     montelukast (SINGULAIR) 10 mg tablet, TAKE ONE TABLET BY MOUTH DAILY (IN THE EVENING). , Disp: 90 Tab, Rfl: 0    insulin lispro (HUMALOG) 100 unit/mL injection, USE WITH INSULIN PUMP AS DIRECTED., Disp: 150 mL, Rfl: 0    nystatin (MYCOSTATIN) powder, Apply as directed, Disp: 60 Bottle, Rfl: 3    irbesartan (AVAPRO) 150 mg tablet, Take 1 Tab by mouth daily. , Disp: 90 Tab, Rfl: 3    atorvastatin (LIPITOR) 40 mg tablet, Take 1 Tab by mouth daily. , Disp: 90 Tab, Rfl: 3    lansoprazole (PREVACID) 30 mg capsule, Take 1 Cap by mouth Daily (before breakfast). , Disp: 90 Cap, Rfl: 3    traZODone (DESYREL) 50 mg tablet, Take 1 Tab by mouth nightly., Disp: 90 Tab, Rfl: 3    levothyroxine (SYNTHROID) 200 mcg tablet, Take 1 Tab by mouth Daily (before breakfast). , Disp: 90 Tab, Rfl: 3    levothyroxine (SYNTHROID) 50 mcg tablet, Take 1 Tab by mouth Daily (before breakfast). , Disp: 90 Tab, Rfl: 3    glucose blood VI test strips (ACCU-CHEK AMARA PLUS TEST STRP) strip, Check BS 10 Times Daily  DxE11.9, use with accu-check combo pump, Disp: 300 Strip, Rfl: 11    progesterone (PROMETRIUM) 100 mg capsule, Take 100 mg by mouth daily. , Disp: , Rfl:     fluticasone (FLONASE) 50 mcg/actuation nasal spray, 1 puff in each nostril twice daily, Disp: 1 Bottle, Rfl: 5    aspirin (ASPIRIN) 325 mg tablet, Take 325 mg by mouth daily. , Disp: , Rfl:     cyanocobalamin (VITAMIN B-12) 1,000 mcg tablet, Take 3,000 mcg by mouth daily. , Disp: , Rfl:     cholecalciferol, vitamin d3, 10,000 unit cap, Take  by mouth daily. , Disp: , Rfl:     calcium 600 mg cap, Take  by mouth daily. , Disp: , Rfl:     MULTIVITAMIN PO, Take  by mouth daily. , Disp: , Rfl:     fexofenadine (ALLEGRA) 180 mg tablet, Take 180 mg by mouth daily. Take / use AM day of surgery with a sip of water per anesthesia protocols. , Disp: , Rfl:      Date Last Reviewed:  5/3/2017     Number of Personal Factors/Comorbidities that affect the Plan of Care:     EXAMINATION:     Observation/Orthostatic Postural Assessment: Forward head posture, bilateral rounded shoulders, increased cervical lordosis  Palpation:          Tenderness to palpation in the cervical paraspinals, upper trapezius, and levators  ROM:    Joint: Eval Date: 4/17/2017 Re-Assess Date: Re-Assess Date:         Cervical AROM      Flexion (% of normal): 50 with tightness     Extension (% of normal): 50 with pain     L sidebending (% of normal): 50 with pain at end range     R sidebending (% of normal): 75 with tightness     L rotation (% of normal): 50 with pain at end range     R rotation (% of normal): 75 with tightness                 Pain at end-range or with AROM? Yes/No yes     Any pain with overpressure? Yes/No yes       Strength:    Joint: Eval Date:  Re-Assess Date:  Re-Assess Date:     RIGHT LEFT RIGHT LEFT RIGHT LEFT   Shoulder flexion:  5/5 4/5       Shoulder extension: 5/5 5/5       Shoulder abduction: 5/5 4/5       Shoulder IR: 5/5 5/5       Shoulder ER: 5/5 4/5       Elbow flexion: 5/5 4/5       Elbow extension: 5/5 4/5       Wrist flexion/extension: 5/5 4/5           Special Tests: Assessed @ Initial Visit    Spurling's Test: Positive     Neurological Screen: Radiating symptoms? Yes/No     Functional Mobility:  Assessed @ Initial Visit   Balance:  Assessed @ Initial Visit        Body Structures Involved:  1. Nerves  2. Bones  3. Joints  4. Muscles  5. Ligaments 6.  7.  8.  Body Functions Affected:  1. Sensory/Pain  2. Neuromusculoskeletal  3.  Movement Related Number of elements that affect the Plan of Care:     CLINICAL PRESENTATION:     Presentation:     CLINICAL DECISION MAKING:     Outcome Measure: Tool Used: Neck Disability Index (NDI)  Score:  Initial: 15/50  Most Recent: X/50 (Date: -- )   Interpretation of Score: The Neck Disability Index is a revised form of the Oswestry Low Back Pain Index and is designed to measure the activities of daily living in person's with neck pain. Each section is scored on a 0-5 scale, 5 representing the greatest disability. The scores of each section are added together for a total score of 50. Score 0 1-10 11-20 21-30 31-40 41-49 50   Modifier CH CI CJ CK CL CM CN       Medical Necessity:   · Skilled intervention continues to be required due to address above deficits affecting participation in basic ADLs and overall functional tolerance. Reason for Services/Other Comments:  · Patient continues to require skilled intervention due to address above deficits affecting participation in basic ADLs and overall functional tolerance. ·  ·    Use of outcome tool(s) and clinical judgement create a POC that gives a:    Clear prediction of patient's progress: LOW COMPLEXITY   TREATMENT:      (In addition to Assessment/Re-Assessment sessions the following treatments were rendered)  THERAPEUTIC EXERCISE: (20 minutes):  Exercises per grid below to improve mobility, strength, balance and coordination. Required minimal visual and verbal cues to promote proper body alignment and promote proper body posture. Progressed resistance, range, repetitions and complexity of movement as indicated.      Date:  4/17/2017 Date:  4/24/2017 Date:  4/27/2017 Date:  5/2/2107 Date:  5/3/2017   Activity/Exercise Parameters Parameters Parameters     Supine chin tucks x5' With cuff feedback x10' x10'     Doorway pec stretch x5' x5'  x5'    Sidelying trunk rotation x5' x5'      Patient education on sleep position with set up x5'       ube   3'/3' x6' x6'   TB rows Green 3x10    TB extensions    Yellow 3x10    Joint repositioning with laser feed back    multil angle with tracking x8' x14'' with multi angle tracking and jpet in horizontal plane                                 MANUAL THERAPY: (25 minutes): Joint mobilization, Soft tissue mobilization and Manipulation was utilized and necessary because of the patient's restricted joint motion, painful spasm, loss of articular motion and restricted motion of soft tissue. Soft tissue mobilization to bilateral upper traps and levators. Manual cervical distraction in supine. P/a mobilization to LC2-3, T1-2, T2-3, T3-4 grade III, IV-each level. Sustained trigger point release to bilateral upper trap. Physiologic cervical rotation to left, grade III. MODALITIES: ( minutes):    (Used abbreviations: MET - muscle energy technique; PNF - proprioceptive neuromuscular facilitation; NMR - neuromuscular re-education; AP - anterior to posterior; PA - posterior to anterior)    Pre-Treatment Assessment: Reports improved symptoms soreness and stiffness today after extended sitting at work. Treatment/Session Assessment:Patient had improved range and pain with cervical spine ROM. Fair control with joint positioning tracking. .  · Pain/ Symptoms: Initial:   4 Post Session:  2 ·   Compliance with Program/Exercises: Will assess as treatment progresses. · Recommendations/Intent for next treatment session: \"Next visit will focus on advancements to more challenging activities and reduction in assistance provided\".   Total Treatment Duration:  PT Patient Time In/Time Out  Time In: 1615  Time Out: 1160 [facility], PT

## 2017-05-03 NOTE — PROGRESS NOTES
Dax Parada  : 1975 [facility] [address]  [phone]   [phone]       OUTPATIENT PHYSICAL THERAPY: Daily Note 5/3/2017    ICD-10: Treatment Diagnosis: Cervicalgia (M54.2)   Precautions/Allergies:   Zithromax [azithromycin]   Fall Risk Score: 1 (? 5 = High Risk)  MD Orders: Eval and Treat MEDICAL/REFERRING DIAGNOSIS:  Neck pain [M54.2]   DATE OF ONSET: four weeks ago  REFERRING PHYSICIAN: Cecilia Carlson MD  RETURN PHYSICIAN APPOINTMENT: TBD by patient      INITIAL ASSESSMENT:  Ms. Dax Parada has attended 1 physical therapy session including initial evaluation. Dax Parada presents with S/S of increased pain, decreased ROM, decreased strength, decreased functional tolerance consistent with S/S of cervical pain. Dax Parada will benefit from home exercise program, therapeutic and postural strengthening exercises, manual therapeutic techniques (ie. Distraction, SOR, myofascial release/soft tissue mobilization) as appropriate to address Hortencia Aranda's current condition. Dax Parada will benefit from skilled PT (medically necessary) to address above deficits affecting participation in basic ADLs and overall functional tolerance. PROBLEM LIST (Impacting functional limitations):  1. Decreased Strength  2. Decreased ADL/Functional Activities  3. Decreased Transfer Abilities  4. Decreased Balance  5. Increased Pain  6. Decreased Activity Tolerance  7. Decreased Work Simplification/Energy Conservation Techniques  8. Decreased Flexibility/Joint Mobility  9. Decreased Knowledge of Precautions  10. Decreased [place] with Home Exercise Program INTERVENTIONS PLANNED:  1. Bed Mobility  2. Cold  3. Cryotherapy  4. Heat  5. Home Exercise Program (HEP)  6. Manual Therapy  7. Neuromuscular Re-education/Strengthening  8. Range of Motion (ROM)  9. Therapeutic Activites  10.  Therapeutic Exercise/Strengthening  11. Transfer Training   TREATMENT PLAN:  Effective Dates: 4/17/2017 TO 7/17/2017. Frequency/Duration: 2 times a week for 6 weeks  GOALS: (Goals have been discussed and agreed upon with patient.)  SHORT-TERM FUNCTIONAL GOALS: Time Frame: 4 weeks  1. Virgilio Dill will report <=4/10 pain to cervical spine with performance of functional spinal mobility and rotation. 2.  Virgilio Dill will demonstrate improved NDI score, indicating spinal improvement from 15/50 to 10/50 affecting minimal to no difficulty with performance of cervical mobility and strengthening. 3.  Virgilio Dill to be independent with initial HEP for cervical region and UE's.   4.  Virgilio Dill will improve ROM to >=90% to assist with improved function during instrumental activities of daily living. [address] [facility] [address] will improve MMT to >=4+/5 to all UE strengthening to return to PLOF and improve functional tolerance. DISCHARGE GOALS: Time Frame: 8 weeks  1. Virgilio Dill will report <=2/10 pain to cervical spine with performance of functional spinal mobility and rotation and minimal to no difficulty with such tasks. 2. Virgilio Dill will demonstrate improved NDI score, indicating spinal improvement from 10/50 to <=5/50 affecting minimal to no difficulty with performance of cervical mobility and strengthening. 3. Virgilio Dill to be independent with advanced HEP for cervical region and UE's. Rehabilitation Potential For Stated Goals: Good  Regarding Hortencia Aranda's therapy, I certify that the treatment plan above will be carried out by a therapist or under their direction. Thank you for this referral,  Anya Forrest PT     Referring Physician Signature: Linward Galeazzi, MD              Date                    HISTORY:    History of Present Injury/Illness (Reason for Referral):  Mrs. April Bynum reports onset of neck pain after waking about a month ago.   She reports that her pain has persisted since that time and led to radiating pain in the left >right shoulder blade, bilateral upper traps, and has led to nightly sleep disturbance. Her symptoms radiate to the left thumb and first two fingers and her neck pain is increased with continued work duties. Her goals are to improve her neck pain and progress to prior level of function at work and to decrease her sleep disturbance. Past Medical History/Comorbidities:   Ms. Pam John  has a past medical history of Abscess in lower abdominal wall (10/25/2013); Arthritis; asthma (10/25/2013); asthma (10/25/2013); Diabetes ([address] (15years of age); Folliculitis ([birth year]); GERD (gastroesophageal reflux disease); HTN (hypertension) (10/25/2013); hyperlipidemia (10/25/2013); Insomnia (10/25/2013); Laryngitis (10/25/2013); Left knee pain (10/25/2013); Morbid obesity ([address] Nausea & vomiting; Otitis media (10/25/2013); Palpitation (10/25/2013); Paronychia (10/25/2013); RA (rheumatoid arthritis) ([address] Rhinitis (10/25/2013); Shoulder pain, probably musculokeletal (10/25/2013); Thyroid disease; Unspecified adverse effect of anesthesia; Unspecified sleep apnea; Upper respiratory infection (10/25/2013); and Uterine fibroid. She also has no past medical history of Aneurysm ([address] Arrhythmia; Autoimmune disease ([address] CAD (coronary artery disease); Cancer ([address] Chronic kidney disease; Chronic pain; Coagulation defects; COPD; Difficult intubation; Heart failure ([address] Liver disease; Malignant hyperthermia due to anesthesia; Other ill-defined conditions; Pseudocholinesterase deficiency; Psychiatric disorder; PUD (peptic ulcer disease); Seizures ([address] Stroke [facility]); or Thromboembolus ([address] Ms. Pam John  has a past surgical history that includes cholecystectomy; heent; and sinus surgery proc unlisted. Social History/Living Environment:     lives with  in private residence.    Prior Level of Function/Work/Activity:  Works as a   at [employer]    Current Medications:    Current Outpatient Prescriptions:     nabumetone (RELAFEN) 500 mg tablet, Take 1 Tab by mouth two (2) times a day., Disp: 60 Tab, Rfl: 0    cyclobenzaprine (FLEXERIL) 10 mg tablet, Take 1 Tab by mouth three (3) times daily as needed for Muscle Spasm(s). , Disp: 30 Tab, Rfl: 0    dorzolamide-timolol (COSOPT) 22.3-6.8 mg/mL ophthalmic solution, Administer 1 Drop to both eyes two (2) times a day., Disp: , Rfl:     montelukast (SINGULAIR) 10 mg tablet, TAKE ONE TABLET BY MOUTH DAILY (IN THE EVENING). , Disp: 90 Tab, Rfl: 0    insulin lispro (HUMALOG) 100 unit/mL injection, USE WITH INSULIN PUMP AS DIRECTED., Disp: 150 mL, Rfl: 0    nystatin (MYCOSTATIN) powder, Apply as directed, Disp: 60 Bottle, Rfl: 3    irbesartan (AVAPRO) 150 mg tablet, Take 1 Tab by mouth daily. , Disp: 90 Tab, Rfl: 3    atorvastatin (LIPITOR) 40 mg tablet, Take 1 Tab by mouth daily. , Disp: 90 Tab, Rfl: 3    lansoprazole (PREVACID) 30 mg capsule, Take 1 Cap by mouth Daily (before breakfast). , Disp: 90 Cap, Rfl: 3    traZODone (DESYREL) 50 mg tablet, Take 1 Tab by mouth nightly., Disp: 90 Tab, Rfl: 3    levothyroxine (SYNTHROID) 200 mcg tablet, Take 1 Tab by mouth Daily (before breakfast). , Disp: 90 Tab, Rfl: 3    levothyroxine (SYNTHROID) 50 mcg tablet, Take 1 Tab by mouth Daily (before breakfast). , Disp: 90 Tab, Rfl: 3    glucose blood VI test strips (ACCU-CHEK AMARA PLUS TEST STRP) strip, Check BS 10 Times Daily  DxE11.9, use with accu-check combo pump, Disp: 300 Strip, Rfl: 11    progesterone (PROMETRIUM) 100 mg capsule, Take 100 mg by mouth daily. , Disp: , Rfl:     fluticasone (FLONASE) 50 mcg/actuation nasal spray, 1 puff in each nostril twice daily, Disp: 1 Bottle, Rfl: 5    aspirin (ASPIRIN) 325 mg tablet, Take 325 mg by mouth daily. , Disp: , Rfl:     cyanocobalamin (VITAMIN B-12) 1,000 mcg tablet, Take 3,000 mcg by mouth daily. , Disp: , Rfl:     cholecalciferol, vitamin d3, 10,000 unit cap, Take  by mouth daily. , Disp: , Rfl:     calcium 600 mg cap, Take  by mouth daily. , Disp: , Rfl:     MULTIVITAMIN PO, Take  by mouth daily. , Disp: , Rfl:     fexofenadine (ALLEGRA) 180 mg tablet, Take 180 mg by mouth daily. Take / use AM day of surgery with a sip of water per anesthesia protocols. , Disp: , Rfl:      Date Last Reviewed:  5/3/2017    Number of Personal Factors/Comorbidities that affect the Plan of Care:    EXAMINATION:    Observation/Orthostatic Postural Assessment: Forward head posture, bilateral rounded shoulders, increased cervical lordosis  Palpation:          Tenderness to palpation in the cervical paraspinals, upper trapezius, and levators  ROM:    Joint: Eval Date: 4/17/2017 Re-Assess Date: Re-Assess Date:         Cervical AROM      Flexion (% of normal): 50 with tightness     Extension (% of normal): 50 with pain     L sidebending (% of normal): 50 with pain at end range     R sidebending (% of normal): 75 with tightness     L rotation (% of normal): 50 with pain at end range     R rotation (% of normal): 75 with tightness                 Pain at end-range or with AROM? Yes/No yes     Any pain with overpressure? Yes/No yes       Strength:    Joint: Eval Date:  Re-Assess Date:  Re-Assess Date:     RIGHT LEFT RIGHT LEFT RIGHT LEFT   Shoulder flexion:  5/5 4/5       Shoulder extension: 5/5 5/5       Shoulder abduction: 5/5 4/5       Shoulder IR: 5/5 5/5       Shoulder ER: 5/5 4/5       Elbow flexion: 5/5 4/5       Elbow extension: 5/5 4/5       Wrist flexion/extension: 5/5 4/5           Special Tests: Assessed @ Initial Visit    Spurling's Test: Positive     Neurological Screen: Radiating symptoms? Yes/No     Functional Mobility:  Assessed @ Initial Visit   Balance:  Assessed @ Initial Visit       Body Structures Involved:  1. Nerves  2. Bones  3. Joints  4. Muscles  5. Ligaments 6. Body Functions Affected:  1. Sensory/Pain  2. Neuromusculoskeletal  3.  Movement Related Activities and Participation Affected:  1. General Tasks and Demands  2. Communication  3. Mobility  4. Self Care  5. Interpersonal Interactions and Relationships  6. Community, Social and [place]   Number of elements that affect the Plan of Care:    CLINICAL PRESENTATION:    Presentation:    CLINICAL DECISION MAKING:    Outcome Measure: Tool Used: Neck Disability Index (NDI)  Score:  Initial: 15/50  Most Recent: X/50 (Date: -- )   Interpretation of Score: The Neck Disability Index is a revised form of the Oswestry Low Back Pain Index and is designed to measure the activities of daily living in person's with neck pain. Each section is scored on a 0-5 scale, 5 representing the greatest disability. The scores of each section are added together for a total score of 50. Score 0 1-10 11-20 21-30 31-40 41-49 50   Modifier CH CI CJ CK CL CM CN       Medical Necessity:   · Skilled intervention continues to be required due to address above deficits affecting participation in basic ADLs and overall functional tolerance. Reason for Services/Other Comments:  · Patient continues to require skilled intervention due to address above deficits affecting participation in basic ADLs and overall functional tolerance. ·    Use of outcome tool(s) and clinical judgement create a POC that gives a:  Clear prediction of patient's progress: LOW COMPLEXITY   TREATMENT:    (In addition to Assessment/Re-Assessment sessions the following treatments were rendered)  THERAPEUTIC EXERCISE: (25 minutes):  Exercises per grid below to improve mobility, strength, balance and coordination. Required minimal visual and verbal cues to promote proper body alignment and promote proper body posture. Progressed resistance, range, repetitions and complexity of movement as indicated.      Date:  4/17/2017 Date:  4/24/2017 Date:  4/27/2017 Date:  5/2/2107   Activity/Exercise Parameters Parameters Parameters    Supine chin tucks x5' With cuff feedback x10' x10' Doorway pec stretch x5' x5'  x5'   Sidelying trunk rotation x5' x5'     Patient education on sleep position with set up x5'      ube   3'/3' x6'   TB rows    Green 3x10   TB extensions    Yellow 3x10   Joint repositioning with laser feed back    multil angle with tracking x8'                              MANUAL THERAPY: (15 minutes): Joint mobilization, Soft tissue mobilization and Manipulation was utilized and necessary because of the patient's restricted joint motion, painful spasm, loss of articular motion and restricted motion of soft tissue. Soft tissue mobilization to bilateral upper traps and levators. Manual cervical distraction in supine. P/a mobilization to LC2-3, T1-2, T2-3, T3-4 grade III, IV-each level  MODALITIES: ( minutes):    (Used abbreviations: MET - muscle energy technique; PNF - proprioceptive neuromuscular facilitation; NMR - neuromuscular re-education; AP - anterior to posterior; PA - posterior to anterior)    Pre-Treatment Assessment: Reports improved symptoms with ROM and improved pain. Treatment/Session Assessment:Patient had improved range and pain with cervical spine ROM. Fair control with joint positioning tracking. .  · Pain/ Symptoms: Initial:   4 Post Session:  2 ·   Compliance with Program/Exercises: Will assess as treatment progresses. · Recommendations/Intent for next treatment session: \"Next visit will focus on advancements to more challenging activities and reduction in assistance provided\".   Total Treatment Duration:  PT Patient Time In/Time Out  Time In: 1530  Time Out: [facility], PT

## 2017-05-04 ENCOUNTER — APPOINTMENT (OUTPATIENT)
Dept: PHYSICAL THERAPY | Age: 42
End: 2017-05-04
Attending: INTERNAL MEDICINE
Payer: COMMERCIAL

## 2017-05-09 ENCOUNTER — HOSPITAL ENCOUNTER (OUTPATIENT)
Dept: PHYSICAL THERAPY | Age: 42
Discharge: HOME | End: 2017-05-09
Attending: INTERNAL MEDICINE
Payer: COMMERCIAL

## 2017-05-09 PROCEDURE — 97012 MECHANICAL TRACTION THERAPY: CPT

## 2017-05-09 PROCEDURE — 97110 THERAPEUTIC EXERCISES: CPT

## 2017-05-09 NOTE — PROGRESS NOTES
Nerissa Wetzel  : [facility] [address]  [phone]   [phone]       OUTPATIENT PHYSICAL THERAPY: Daily Note 2017    ICD-10: Treatment Diagnosis: Cervicalgia (M54.2)   Precautions/Allergies:   Zithromax [azithromycin]   Fall Risk Score: 1 (? 5 = High Risk)  MD Orders: Eval and Treat MEDICAL/REFERRING DIAGNOSIS:  Neck pain [M54.2]   DATE OF ONSET: four weeks ago  REFERRING PHYSICIAN: Jessica Acevedo MD  RETURN PHYSICIAN APPOINTMENT: TBD by patient      INITIAL ASSESSMENT:  Ms. Nerissa Wetzel has attended 1 physical therapy session including initial evaluation. Nerissa Wetzel presents with S/S of increased pain, decreased ROM, decreased strength, decreased functional tolerance consistent with S/S of cervical pain. Nerissa Wetzel will benefit from home exercise program, therapeutic and postural strengthening exercises, manual therapeutic techniques (ie. Distraction, SOR, myofascial release/soft tissue mobilization) as appropriate to address Hortencia Aranda's current condition. Nerissa Wetzel will benefit from skilled PT (medically necessary) to address above deficits affecting participation in basic ADLs and overall functional tolerance. PROBLEM LIST (Impacting functional limitations):  1. Decreased Strength  2. Decreased ADL/Functional Activities  3. Decreased Transfer Abilities  4. Decreased Balance  5. Increased Pain  6. Decreased Activity Tolerance  7. Decreased Work Simplification/Energy Conservation Techniques  8. Decreased Flexibility/Joint Mobility  9. Decreased Knowledge of Precautions  10. Decreased [place] with Home Exercise Program INTERVENTIONS PLANNED:  1. Bed Mobility  2. Cold  3. Cryotherapy  4. Heat  5. Home Exercise Program (HEP)  6. Manual Therapy  7. Neuromuscular Re-education/Strengthening  8. Range of Motion (ROM)  9. Therapeutic Activites  10.  Therapeutic Exercise/Strengthening  11. Transfer Training   TREATMENT PLAN:  Effective Dates: 4/17/2017 TO 7/17/2017. Frequency/Duration: 2 times a week for 6 weeks  GOALS: (Goals have been discussed and agreed upon with patient.)  SHORT-TERM FUNCTIONAL GOALS: Time Frame: 4 weeks  1. Leanna Golden will report <=4/10 pain to cervical spine with performance of functional spinal mobility and rotation. 2.  Leanna Golden will demonstrate improved NDI score, indicating spinal improvement from 15/50 to 10/50 affecting minimal to no difficulty with performance of cervical mobility and strengthening. 3.  Leanna Golden to be independent with initial HEP for cervical region and UE's.   4.  Leanna Golden will improve ROM to >=90% to assist with improved function during instrumental activities of daily living. [facility] will improve MMT to >=4+/5 to all UE strengthening to return to PLOF and improve functional tolerance. DISCHARGE GOALS: Time Frame: 8 weeks  1. Leanna Golden will report <=2/10 pain to cervical spine with performance of functional spinal mobility and rotation and minimal to no difficulty with such tasks. 2. Leanna Golden will demonstrate improved NDI score, indicating spinal improvement from 10/50 to <=5/50 affecting minimal to no difficulty with performance of cervical mobility and strengthening. 3. Leanna Golden to be independent with advanced HEP for cervical region and UE's. Rehabilitation Potential For Stated Goals: Good  Regarding Hortencia Aranda's therapy, I certify that the treatment plan above will be carried out by a therapist or under their direction. Thank you for this referral,  Deandra Moreland PT     Referring Physician Signature: Guillermina Riedel, MD              Date                    HISTORY:     History of Present Injury/Illness (Reason for Referral):  Mrs. Lacho Sanchez reports onset of neck pain after waking about a month ago.   She reports that her pain has persisted since that time and led to radiating pain in the left >right shoulder blade, bilateral upper traps, and has led to nightly sleep disturbance. Her symptoms radiate to the left thumb and first two fingers and her neck pain is increased with continued work duties. Her goals are to improve her neck pain and progress to prior level of function at work and to decrease her sleep disturbance. Past Medical History/Comorbidities:   Ms. Mariola Cummings  has a past medical history of Abscess in lower abdominal wall (10/25/2013); Arthritis; asthma (10/25/2013); asthma (10/25/2013); Diabetes ([address] (15years of age); Folliculitis (58/19/9820); GERD (gastroesophageal reflux disease); HTN (hypertension) (10/25/2013); hyperlipidemia (10/25/2013); Insomnia (10/25/2013); Laryngitis (10/25/2013); Left knee pain (10/25/2013); Morbid obesity ([address] Nausea & vomiting; Otitis media (10/25/2013); Palpitation (10/25/2013); Paronychia (10/25/2013); RA (rheumatoid arthritis) ([address] Rhinitis (10/25/2013); Shoulder pain, probably musculokeletal (10/25/2013); Thyroid disease; Unspecified adverse effect of anesthesia; Unspecified sleep apnea; Upper respiratory infection (10/25/2013); and Uterine fibroid. She also has no past medical history of Aneurysm ([address] Arrhythmia; Autoimmune disease ([address] CAD (coronary artery disease); Cancer ([address] Chronic kidney disease; Chronic pain; Coagulation defects; COPD; Difficult intubation; Heart failure ([address] Liver disease; Malignant hyperthermia due to anesthesia; Other ill-defined conditions; Pseudocholinesterase deficiency; Psychiatric disorder; PUD (peptic ulcer disease); Seizures ([address] Stroke [facility]); or Thromboembolus ([address] Ms. Mariola Cummings  has a past surgical history that includes cholecystectomy; heent; and sinus surgery proc unlisted. Social History/Living Environment:     lives with  in private residence.    Prior Level of Function/Work/Activity:  Works as a   at [employer]    Current Medications:    Current Outpatient Prescriptions:     nabumetone (RELAFEN) 500 mg tablet, Take 1 Tab by mouth two (2) times a day., Disp: 60 Tab, Rfl: 0    cyclobenzaprine (FLEXERIL) 10 mg tablet, Take 1 Tab by mouth three (3) times daily as needed for Muscle Spasm(s). , Disp: 30 Tab, Rfl: 0    dorzolamide-timolol (COSOPT) 22.3-6.8 mg/mL ophthalmic solution, Administer 1 Drop to both eyes two (2) times a day., Disp: , Rfl:     montelukast (SINGULAIR) 10 mg tablet, TAKE ONE TABLET BY MOUTH DAILY (IN THE EVENING). , Disp: 90 Tab, Rfl: 0    insulin lispro (HUMALOG) 100 unit/mL injection, USE WITH INSULIN PUMP AS DIRECTED., Disp: 150 mL, Rfl: 0    nystatin (MYCOSTATIN) powder, Apply as directed, Disp: 60 Bottle, Rfl: 3    irbesartan (AVAPRO) 150 mg tablet, Take 1 Tab by mouth daily. , Disp: 90 Tab, Rfl: 3    atorvastatin (LIPITOR) 40 mg tablet, Take 1 Tab by mouth daily. , Disp: 90 Tab, Rfl: 3    lansoprazole (PREVACID) 30 mg capsule, Take 1 Cap by mouth Daily (before breakfast). , Disp: 90 Cap, Rfl: 3    traZODone (DESYREL) 50 mg tablet, Take 1 Tab by mouth nightly., Disp: 90 Tab, Rfl: 3    levothyroxine (SYNTHROID) 200 mcg tablet, Take 1 Tab by mouth Daily (before breakfast). , Disp: 90 Tab, Rfl: 3    levothyroxine (SYNTHROID) 50 mcg tablet, Take 1 Tab by mouth Daily (before breakfast). , Disp: 90 Tab, Rfl: 3    glucose blood VI test strips (ACCU-CHEK AMARA PLUS TEST STRP) strip, Check BS 10 Times Daily  DxE11.9, use with accu-check combo pump, Disp: 300 Strip, Rfl: 11    progesterone (PROMETRIUM) 100 mg capsule, Take 100 mg by mouth daily. , Disp: , Rfl:     fluticasone (FLONASE) 50 mcg/actuation nasal spray, 1 puff in each nostril twice daily, Disp: 1 Bottle, Rfl: 5    aspirin (ASPIRIN) 325 mg tablet, Take 325 mg by mouth daily. , Disp: , Rfl:     cyanocobalamin (VITAMIN B-12) 1,000 mcg tablet, Take 3,000 mcg by mouth daily. , Disp: , Rfl:     cholecalciferol, vitamin d3, 10,000 unit cap, Take  by mouth daily. , Disp: , Rfl:     calcium 600 mg cap, Take  by mouth daily. , Disp: , Rfl:     MULTIVITAMIN PO, Take  by mouth daily. , Disp: , Rfl:     fexofenadine (ALLEGRA) 180 mg tablet, Take 180 mg by mouth daily. Take / use AM day of surgery with a sip of water per anesthesia protocols. , Disp: , Rfl:      Date Last Reviewed:  5/9/2017     Number of Personal Factors/Comorbidities that affect the Plan of Care:     EXAMINATION:     Observation/Orthostatic Postural Assessment: Forward head posture, bilateral rounded shoulders, increased cervical lordosis  Palpation:          Tenderness to palpation in the cervical paraspinals, upper trapezius, and levators  ROM:    Joint: Eval Date: 4/17/2017 Re-Assess Date: Re-Assess Date:         Cervical AROM      Flexion (% of normal): 50 with tightness     Extension (% of normal): 50 with pain     L sidebending (% of normal): 50 with pain at end range     R sidebending (% of normal): 75 with tightness     L rotation (% of normal): 50 with pain at end range     R rotation (% of normal): 75 with tightness                 Pain at end-range or with AROM? Yes/No yes     Any pain with overpressure? Yes/No yes       Strength:    Joint: Eval Date:  Re-Assess Date:  Re-Assess Date:     RIGHT LEFT RIGHT LEFT RIGHT LEFT   Shoulder flexion:  5/5 4/5       Shoulder extension: 5/5 5/5       Shoulder abduction: 5/5 4/5       Shoulder IR: 5/5 5/5       Shoulder ER: 5/5 4/5       Elbow flexion: 5/5 4/5       Elbow extension: 5/5 4/5       Wrist flexion/extension: 5/5 4/5           Special Tests: Assessed @ Initial Visit    Spurling's Test: Positive     Neurological Screen: Radiating symptoms? Yes/No     Functional Mobility:  Assessed @ Initial Visit   Balance:  Assessed @ Initial Visit        Body Structures Involved:  1. Nerves  2. Bones  3. Joints  4. Muscles  5. Ligaments 6.  7.  8.  Body Functions Affected:  1. Sensory/Pain  2. Neuromusculoskeletal  3.  Movement Related Number of elements that affect the Plan of Care:     CLINICAL PRESENTATION:     Presentation:     CLINICAL DECISION MAKING:     Outcome Measure: Tool Used: Neck Disability Index (NDI)  Score:  Initial: 15/50  Most Recent: X/50 (Date: -- )   Interpretation of Score: The Neck Disability Index is a revised form of the Oswestry Low Back Pain Index and is designed to measure the activities of daily living in person's with neck pain. Each section is scored on a 0-5 scale, 5 representing the greatest disability. The scores of each section are added together for a total score of 50. Score 0 1-10 11-20 21-30 31-40 41-49 50   Modifier CH CI CJ CK CL CM CN       Medical Necessity:   · Skilled intervention continues to be required due to address above deficits affecting participation in basic ADLs and overall functional tolerance. Reason for Services/Other Comments:  · Patient continues to require skilled intervention due to address above deficits affecting participation in basic ADLs and overall functional tolerance. ·  ·    Use of outcome tool(s) and clinical judgement create a POC that gives a:    Clear prediction of patient's progress: LOW COMPLEXITY   TREATMENT:      (In addition to Assessment/Re-Assessment sessions the following treatments were rendered)  THERAPEUTIC EXERCISE: (25 minutes):  Exercises per grid below to improve mobility, strength, balance and coordination. Required minimal visual and verbal cues to promote proper body alignment and promote proper body posture. Progressed resistance, range, repetitions and complexity of movement as indicated.      Date:  4/17/2017 Date:  4/24/2017 Date:  4/27/2017 Date:  5/2/2107 Date:  5/3/2017 Date:  5/9/2017   Activity/Exercise Parameters Parameters Parameters      Supine chin tucks x5' With cuff feedback x10' x10'   x5'   Doorway pec stretch x5' x5'  x5'     Sidelying trunk rotation x5' x5'       Patient education on sleep position with set up x5'        ube 3'/3' x6' x6' x6'   TB rows    Green 3x10     TB extensions    Yellow 3x10     Joint repositioning with laser feed back    multil angle with tracking x8' x14'' with multi angle tracking and jpet in horizontal plane x15' with multi angle tracking and jpet in horizontal plane                                    MANUAL THERAPY: (0 minutes): Joint mobilization, Soft tissue mobilization and Manipulation was utilized and necessary because of the patient's restricted joint motion, painful spasm, loss of articular motion and restricted motion of soft tissue. Soft tissue mobilization to bilateral upper traps and levators. Manual cervical distraction in supine. P/a mobilization to LC2-3, T1-2, T2-3, T3-4 grade III, IV-each level. Sustained trigger point release to bilateral upper trap. Physiologic cervical rotation to left, grade III. MODALITIES: ( 15 minutes): mechanical cervical traction in supine hooklying 16# max tension, 8# least tension. (Used abbreviations: MET - muscle energy technique; PNF - proprioceptive neuromuscular facilitation; NMR - neuromuscular re-education; AP - anterior to posterior; PA - posterior to anterior)    Pre-Treatment Assessment: Reports right arm numbness over the weekend with sitting. Treatment/Session Assessment:Patient reported no change in arm symptoms, but stiffness after treatment. .  · Pain/ Symptoms: Initial:   4 Post Session:  3 ·   Compliance with Program/Exercises: Will assess as treatment progresses. · Recommendations/Intent for next treatment session: \"Next visit will focus on advancements to more challenging activities and reduction in assistance provided\".   Total Treatment Duration:  PT Patient Time In/Time Out  Time In: 1600  Time Out: 1208 [facility],

## 2017-05-11 ENCOUNTER — HOSPITAL ENCOUNTER (OUTPATIENT)
Dept: PHYSICAL THERAPY | Age: 42
Discharge: HOME | End: 2017-05-11
Attending: INTERNAL MEDICINE
Payer: COMMERCIAL

## 2017-05-11 PROCEDURE — 97110 THERAPEUTIC EXERCISES: CPT

## 2017-05-11 PROCEDURE — 97140 MANUAL THERAPY 1/> REGIONS: CPT

## 2017-05-11 NOTE — PROGRESS NOTES
Virgilio Dill  : 1975 [facility] [address]  [phone]   [phone]       OUTPATIENT PHYSICAL THERAPY: Daily Note 2017    ICD-10: Treatment Diagnosis: Cervicalgia (M54.2)   Precautions/Allergies:   Zithromax [azithromycin]   Fall Risk Score: 1 (? 5 = High Risk)  MD Orders: Eval and Treat MEDICAL/REFERRING DIAGNOSIS:  Neck pain [M54.2]   DATE OF ONSET: four weeks ago  REFERRING PHYSICIAN: Linward Galeazzi, MD  RETURN PHYSICIAN APPOINTMENT: TBD by patient      INITIAL ASSESSMENT:  Ms. Virgilio Dill has attended 1 physical therapy session including initial evaluation. Virgilio Dill presents with S/S of increased pain, decreased ROM, decreased strength, decreased functional tolerance consistent with S/S of cervical pain. Virgilio Dill will benefit from home exercise program, therapeutic and postural strengthening exercises, manual therapeutic techniques (ie. Distraction, SOR, myofascial release/soft tissue mobilization) as appropriate to address Hortencia Aranda's current condition. Virgilio Dill will benefit from skilled PT (medically necessary) to address above deficits affecting participation in basic ADLs and overall functional tolerance. PROBLEM LIST (Impacting functional limitations):  1. Decreased Strength  2. Decreased ADL/Functional Activities  3. Decreased Transfer Abilities  4. Decreased Balance  5. Increased Pain  6. Decreased Activity Tolerance  7. Decreased Work Simplification/Energy Conservation Techniques  8. Decreased Flexibility/Joint Mobility  9. Decreased Knowledge of Precautions  10. Decreased [place] with Home Exercise Program INTERVENTIONS PLANNED:  1. Bed Mobility  2. Cold  3. Cryotherapy  4. Heat  5. Home Exercise Program (HEP)  6. Manual Therapy  7. Neuromuscular Re-education/Strengthening  8. Range of Motion (ROM)  9. Therapeutic Activites  10.  Therapeutic Exercise/Strengthening  11. Transfer Training   TREATMENT PLAN:  Effective Dates: 4/17/2017 TO 7/17/2017. Frequency/Duration: 2 times a week for 6 weeks  GOALS: (Goals have been discussed and agreed upon with patient.)  SHORT-TERM FUNCTIONAL GOALS: Time Frame: 4 weeks  1. Juno Melgoza will report <=4/10 pain to cervical spine with performance of functional spinal mobility and rotation. 2.  Juno Melgoza will demonstrate improved NDI score, indicating spinal improvement from 15/50 to 10/50 affecting minimal to no difficulty with performance of cervical mobility and strengthening. 3.  Juno Melgoza to be independent with initial HEP for cervical region and UE's.   4.  Juno Melgoza will improve ROM to >=90% to assist with improved function during instrumental activities of daily living. [address] will improve MMT to >=4+/5 to all UE strengthening to return to PLOF and improve functional tolerance. DISCHARGE GOALS: Time Frame: 8 weeks  1. Juno Melgoza will report <=2/10 pain to cervical spine with performance of functional spinal mobility and rotation and minimal to no difficulty with such tasks. 2. Juno Melgoza will demonstrate improved NDI score, indicating spinal improvement from 10/50 to <=5/50 affecting minimal to no difficulty with performance of cervical mobility and strengthening. 3. Juno Melgoza to be independent with advanced HEP for cervical region and UE's. Rehabilitation Potential For Stated Goals: Good  Regarding Hortencia Aranda's therapy, I certify that the treatment plan above will be carried out by a therapist or under their direction. Thank you for this referral,  Hawa Rosado PT     Referring Physician Signature: Mona Miranda MD              Date                    HISTORY:     History of Present Injury/Illness (Reason for Referral):  Mrs. Yuriy Leung reports onset of neck pain after waking about a month ago.   She reports that her pain has persisted since that time and led to radiating pain in the left >right shoulder blade, bilateral upper traps, and has led to nightly sleep disturbance. Her symptoms radiate to the left thumb and first two fingers and her neck pain is increased with continued work duties. Her goals are to improve her neck pain and progress to prior level of function at work and to decrease her sleep disturbance. Past Medical History/Comorbidities:   Ms. Jaime Benitez  has a past medical history of Abscess in lower abdominal wall (10/25/2013); Arthritis; asthma (10/25/2013); asthma (10/25/2013); Diabetes ([address] (15years of age); Folliculitis (30/50/27[birth year]); GERD (gastroesophageal reflux disease); HTN (hypertension) (10/25/2013); hyperlipidemia (10/25/2013); Insomnia (10/25/2013); Laryngitis (10/25/2013); Left knee pain (10/25/2013); Morbid obesity ([address] Nausea & vomiting; Otitis media (10/25/2013); Palpitation (10/25/2013); Paronychia (10/25/2013); RA (rheumatoid arthritis) ([address] Rhinitis (10/25/2013); Shoulder pain, probably musculokeletal (10/25/2013); Thyroid disease; Unspecified adverse effect of anesthesia; Unspecified sleep apnea; Upper respiratory infection (10/25/2013); and Uterine fibroid. She also has no past medical history of Aneurysm ([address] Arrhythmia; Autoimmune disease ([address] CAD (coronary artery disease); Cancer ([address] Chronic kidney disease; Chronic pain; Coagulation defects; COPD; Difficult intubation; Heart failure ([address] Liver disease; Malignant hyperthermia due to anesthesia; Other ill-defined conditions; Pseudocholinesterase deficiency; Psychiatric disorder; PUD (peptic ulcer disease); Seizures ([address] Stroke [facility]); or Thromboembolus ([address] Ms. Jaime Benitez  has a past surgical history that includes cholecystectomy; heent; and sinus surgery proc unlisted. Social History/Living Environment:     lives with  in private residence.    Prior Level of Function/Work/Activity:  Works as a   at [employer]    Current Medications:    Current Outpatient Prescriptions:     nabumetone (RELAFEN) 500 mg tablet, Take 1 Tab by mouth two (2) times a day., Disp: 60 Tab, Rfl: 0    cyclobenzaprine (FLEXERIL) 10 mg tablet, Take 1 Tab by mouth three (3) times daily as needed for Muscle Spasm(s). , Disp: 30 Tab, Rfl: 0    dorzolamide-timolol (COSOPT) 22.3-6.8 mg/mL ophthalmic solution, Administer 1 Drop to both eyes two (2) times a day., Disp: , Rfl:     montelukast (SINGULAIR) 10 mg tablet, TAKE ONE TABLET BY MOUTH DAILY (IN THE EVENING). , Disp: 90 Tab, Rfl: 0    insulin lispro (HUMALOG) 100 unit/mL injection, USE WITH INSULIN PUMP AS DIRECTED., Disp: 150 mL, Rfl: 0    nystatin (MYCOSTATIN) powder, Apply as directed, Disp: 60 Bottle, Rfl: 3    irbesartan (AVAPRO) 150 mg tablet, Take 1 Tab by mouth daily. , Disp: 90 Tab, Rfl: 3    atorvastatin (LIPITOR) 40 mg tablet, Take 1 Tab by mouth daily. , Disp: 90 Tab, Rfl: 3    lansoprazole (PREVACID) 30 mg capsule, Take 1 Cap by mouth Daily (before breakfast). , Disp: 90 Cap, Rfl: 3    traZODone (DESYREL) 50 mg tablet, Take 1 Tab by mouth nightly., Disp: 90 Tab, Rfl: 3    levothyroxine (SYNTHROID) 200 mcg tablet, Take 1 Tab by mouth Daily (before breakfast). , Disp: 90 Tab, Rfl: 3    levothyroxine (SYNTHROID) 50 mcg tablet, Take 1 Tab by mouth Daily (before breakfast). , Disp: 90 Tab, Rfl: 3    glucose blood VI test strips (ACCU-CHEK AMARA PLUS TEST STRP) strip, Check BS 10 Times Daily  DxE11.9, use with accu-check combo pump, Disp: 300 Strip, Rfl: 11    progesterone (PROMETRIUM) 100 mg capsule, Take 100 mg by mouth daily. , Disp: , Rfl:     fluticasone (FLONASE) 50 mcg/actuation nasal spray, 1 puff in each nostril twice daily, Disp: 1 Bottle, Rfl: 5    aspirin (ASPIRIN) 325 mg tablet, Take 325 mg by mouth daily. , Disp: , Rfl:     cyanocobalamin (VITAMIN B-12) 1,000 mcg tablet, Take 3,000 mcg by mouth daily. , Disp: , Rfl:     cholecalciferol, vitamin d3, 10,000 unit cap, Take  by mouth daily. , Disp: , Rfl:     calcium 600 mg cap, Take  by mouth daily. , Disp: , Rfl:     MULTIVITAMIN PO, Take  by mouth daily. , Disp: , Rfl:     fexofenadine (ALLEGRA) 180 mg tablet, Take 180 mg by mouth daily. Take / use AM day of surgery with a sip of water per anesthesia protocols. , Disp: , Rfl:      Date Last Reviewed:  5/11/2017     Number of Personal Factors/Comorbidities that affect the Plan of Care:     EXAMINATION:     Observation/Orthostatic Postural Assessment: Forward head posture, bilateral rounded shoulders, increased cervical lordosis  Palpation:          Tenderness to palpation in the cervical paraspinals, upper trapezius, and levators  ROM:    Joint: Eval Date: 4/17/2017 Re-Assess Date: Re-Assess Date:         Cervical AROM      Flexion (% of normal): 50 with tightness     Extension (% of normal): 50 with pain     L sidebending (% of normal): 50 with pain at end range     R sidebending (% of normal): 75 with tightness     L rotation (% of normal): 50 with pain at end range     R rotation (% of normal): 75 with tightness                 Pain at end-range or with AROM? Yes/No yes     Any pain with overpressure? Yes/No yes       Strength:    Joint: Eval Date:  Re-Assess Date:  Re-Assess Date:     RIGHT LEFT RIGHT LEFT RIGHT LEFT   Shoulder flexion:  5/5 4/5       Shoulder extension: 5/5 5/5       Shoulder abduction: 5/5 4/5       Shoulder IR: 5/5 5/5       Shoulder ER: 5/5 4/5       Elbow flexion: 5/5 4/5       Elbow extension: 5/5 4/5       Wrist flexion/extension: 5/5 4/5           Special Tests: Assessed @ Initial Visit    Spurling's Test: Positive     Neurological Screen: Radiating symptoms? Yes/No     Functional Mobility:  Assessed @ Initial Visit   Balance:  Assessed @ Initial Visit        Body Structures Involved:  1. Nerves  2. Bones  3. Joints  4. Muscles  5. Ligaments 6.  7.  8.  Body Functions Affected:  1. Sensory/Pain  2. Neuromusculoskeletal  3.  Movement Related Number of elements that affect the Plan of Care:     CLINICAL PRESENTATION:     Presentation:     CLINICAL DECISION MAKING:     Outcome Measure: Tool Used: Neck Disability Index (NDI)  Score:  Initial: 15/50  Most Recent: X/50 (Date: -- )   Interpretation of Score: The Neck Disability Index is a revised form of the Oswestry Low Back Pain Index and is designed to measure the activities of daily living in person's with neck pain. Each section is scored on a 0-5 scale, 5 representing the greatest disability. The scores of each section are added together for a total score of 50. Score 0 1-10 11-20 21-30 31-40 41-49 50   Modifier CH CI CJ CK CL CM CN       Medical Necessity:   · Skilled intervention continues to be required due to address above deficits affecting participation in basic ADLs and overall functional tolerance. Reason for Services/Other Comments:  · Patient continues to require skilled intervention due to address above deficits affecting participation in basic ADLs and overall functional tolerance. ·  ·    Use of outcome tool(s) and clinical judgement create a POC that gives a:    Clear prediction of patient's progress: LOW COMPLEXITY   TREATMENT:      (In addition to Assessment/Re-Assessment sessions the following treatments were rendered)  THERAPEUTIC EXERCISE: (15 minutes):  Exercises per grid below to improve mobility, strength, balance and coordination. Required minimal visual and verbal cues to promote proper body alignment and promote proper body posture. Progressed resistance, range, repetitions and complexity of movement as indicated.      Date:  4/17/2017 Date:  4/24/2017 Date:  4/27/2017 Date:  5/2/2107 Date:  5/3/2017 Date:  5/9/2017 Date:  5/11/2017   Activity/Exercise Parameters Parameters Parameters       Supine chin tucks x5' With cuff feedback x10' x10'   x5' x5'   Doorway pec stretch x5' x5'  x5'      Sidelying trunk rotation x5' x5'        Patient education on sleep position with set up x5'         ube   3'/3' x6' x6' x6' x6'   TB rows    Green 3x10      TB extensions    Yellow 3x10      Joint repositioning with laser feed back    multil angle with tracking x8' x14'' with multi angle tracking and jpet in horizontal plane x15' with multi angle tracking and jpet in horizontal plane    Seated cervical traction       x5'                             MANUAL THERAPY: (25 minutes): Joint mobilization, Soft tissue mobilization and Manipulation was utilized and necessary because of the patient's restricted joint motion, painful spasm, loss of articular motion and restricted motion of soft tissue. Soft tissue mobilization to bilateral upper traps and levators. Manual cervical distraction in supine. P/a mobilization to LC2-3, T1-2, T2-3, T3-4 grade III, IV-each level. Sustained trigger point release to bilateral upper trap. Physiologic cervical rotation to left, grade III. MODALITIES: ( 15 minutes): mechanical cervical traction in supine hooklying 16# max tension, 8# least tension. (Used abbreviations: MET - muscle energy technique; PNF - proprioceptive neuromuscular facilitation; NMR - neuromuscular re-education; AP - anterior to posterior; PA - posterior to anterior)    Pre-Treatment Assessment: Reports some increased tightness after cervical traction at last visit. Treatment/Session Assessment:Patient reported improved arm pain after treatment. · Pain/ Symptoms: Initial:   4 Post Session:  2 ·   Compliance with Program/Exercises: Will assess as treatment progresses. · Recommendations/Intent for next treatment session: \"Next visit will focus on advancements to more challenging activities and reduction in assistance provided\".   Total Treatment Duration:  PT Patient Time In/Time Out  Time In: 1600  Time Out: 1208 [facility],

## 2017-05-16 ENCOUNTER — HOSPITAL ENCOUNTER (OUTPATIENT)
Dept: PHYSICAL THERAPY | Age: 42
Discharge: HOME | End: 2017-05-16
Attending: INTERNAL MEDICINE
Payer: COMMERCIAL

## 2017-05-16 PROCEDURE — 97140 MANUAL THERAPY 1/> REGIONS: CPT

## 2017-05-16 PROCEDURE — 97110 THERAPEUTIC EXERCISES: CPT

## 2017-05-18 ENCOUNTER — HOSPITAL ENCOUNTER (OUTPATIENT)
Dept: PHYSICAL THERAPY | Age: 42
Discharge: HOME | End: 2017-05-18
Attending: INTERNAL MEDICINE
Payer: COMMERCIAL

## 2017-05-18 NOTE — PROGRESS NOTES
Lana Turcios  : 1975 [facility] [address]  [phone]   [phone]       OUTPATIENT PHYSICAL THERAPY: Daily Note  2017     ICD-10: Treatment Diagnosis: Cervicalgia (M54.2)   Precautions/Allergies:   Zithromax [azithromycin]   Fall Risk Score: 1 (? 5 = High Risk)  MD Orders: Eval and Treat MEDICAL/REFERRING DIAGNOSIS:  Neck pain [M54.2]   DATE OF ONSET: four weeks ago  REFERRING PHYSICIAN: Fritz Tellez MD  RETURN PHYSICIAN APPOINTMENT: TBD by patient      INITIAL ASSESSMENT:  Ms. Lana Turcios has attended 1 physical therapy session including initial evaluation. Lana Turcios presents with S/S of increased pain, decreased ROM, decreased strength, decreased functional tolerance consistent with S/S of cervical pain. Lana Turcios will benefit from home exercise program, therapeutic and postural strengthening exercises, manual therapeutic techniques (ie. Distraction, SOR, myofascial release/soft tissue mobilization) as appropriate to address Hortencia Aranda's current condition. Lana Turcios will benefit from skilled PT (medically necessary) to address above deficits affecting participation in basic ADLs and overall functional tolerance. PROBLEM LIST (Impacting functional limitations):  1. Decreased Strength  2. Decreased ADL/Functional Activities  3. Decreased Transfer Abilities  4. Decreased Balance  5. Increased Pain  6. Decreased Activity Tolerance  7. Decreased Work Simplification/Energy Conservation Techniques  8. Decreased Flexibility/Joint Mobility  9. Decreased Knowledge of Precautions  10. Decreased Bandy with Home Exercise Program INTERVENTIONS PLANNED:  1. Bed Mobility  2. Cold  3. Cryotherapy  4. Heat  5. Home Exercise Program (HEP)  6. Manual Therapy  7. Neuromuscular Re-education/Strengthening  8. Range of Motion (ROM)  9. Therapeutic Activites  10.  Therapeutic Exercise/Strengthening  11. Transfer Training   TREATMENT PLAN:  Effective Dates: 4/17/2017 TO 7/17/2017. Frequency/Duration: 2 times a week for 6 weeks  GOALS: (Goals have been discussed and agreed upon with patient.)  SHORT-TERM FUNCTIONAL GOALS: Time Frame: 4 weeks  1. Dada Seat will report <=4/10 pain to cervical spine with performance of functional spinal mobility and rotation. 2.  Dada Seat will demonstrate improved NDI score, indicating spinal improvement from 15/50 to 10/50 affecting minimal to no difficulty with performance of cervical mobility and strengthening. 3.  Dada Seat to be independent with initial HEP for cervical region and UE's.   4.  Dada Seat will improve ROM to >=90% to assist with improved function during instrumental activities of daily living. [address] will improve MMT to >=4+/5 to all UE strengthening to return to PLOF and improve functional tolerance. DISCHARGE GOALS: Time Frame: 8 weeks  1. Dada Seat will report <=2/10 pain to cervical spine with performance of functional spinal mobility and rotation and minimal to no difficulty with such tasks. 2. Dada Seat will demonstrate improved NDI score, indicating spinal improvement from 10/50 to <=5/50 affecting minimal to no difficulty with performance of cervical mobility and strengthening. 3. Dada Seat to be independent with advanced HEP for cervical region and UE's. Rehabilitation Potential For Stated Goals: Good  Regarding Hortenciascooter Aranda's therapy, I certify that the treatment plan above will be carried out by a therapist or under their direction. Thank you for this referral,  John Longoria PT     Referring Physician Signature: Michael Avalos MD              Date                    HISTORY:     History of Present Injury/Illness (Reason for Referral):  Mrs. Smitha Brothers reports onset of neck pain after waking about a month ago.   She reports that her pain has persisted since that time and led to radiating pain in the left >right shoulder blade, bilateral upper traps, and has led to nightly sleep disturbance. Her symptoms radiate to the left thumb and first two fingers and her neck pain is increased with continued work duties. Her goals are to improve her neck pain and progress to prior level of function at work and to decrease her sleep disturbance. Past Medical History/Comorbidities:   Ms. Za Fernandez  has a past medical history of Abscess in lower abdominal wall (10/25/2013); Arthritis; asthma (10/25/2013); asthma (10/25/2013); Diabetes ([address] (15years of age); Folliculitis (87/38/4562); GERD (gastroesophageal reflux disease); HTN (hypertension) (10/25/2013); hyperlipidemia (10/25/2013); Insomnia (10/25/2013); Laryngitis (10/25/2013); Left knee pain (10/25/2013); Morbid obesity ([address] Nausea & vomiting; Otitis media (10/25/2013); Palpitation (10/25/2013); Paronychia (10/25/2013); RA (rheumatoid arthritis) ([address] Rhinitis (10/25/2013); Shoulder pain, probably musculokeletal (10/25/2013); Thyroid disease; Unspecified adverse effect of anesthesia; Unspecified sleep apnea; Upper respiratory infection (10/25/2013); and Uterine fibroid. She also has no past medical history of Aneurysm ([address] Arrhythmia; Autoimmune disease ([address] CAD (coronary artery disease); Cancer ([address] Chronic kidney disease; Chronic pain; Coagulation defects; COPD; Difficult intubation; Heart failure ([address] Liver disease; Malignant hyperthermia due to anesthesia; Other ill-defined conditions; Pseudocholinesterase deficiency; Psychiatric disorder; PUD (peptic ulcer disease); Seizures ([address] Stroke [facility]); or Thromboembolus ([address] Ms. Za Fernandez  has a past surgical history that includes cholecystectomy; heent; and sinus surgery proc unlisted. Social History/Living Environment:     lives with  in private residence.    Prior Level of Function/Work/Activity:  Works as a   at [employer]    Current Medications:    Current Outpatient Prescriptions:     nabumetone (RELAFEN) 500 mg tablet, Take 1 Tab by mouth two (2) times a day., Disp: 60 Tab, Rfl: 0    cyclobenzaprine (FLEXERIL) 10 mg tablet, Take 1 Tab by mouth three (3) times daily as needed for Muscle Spasm(s). , Disp: 30 Tab, Rfl: 0    dorzolamide-timolol (COSOPT) 22.3-6.8 mg/mL ophthalmic solution, Administer 1 Drop to both eyes two (2) times a day., Disp: , Rfl:     montelukast (SINGULAIR) 10 mg tablet, TAKE ONE TABLET BY MOUTH DAILY (IN THE EVENING). , Disp: 90 Tab, Rfl: 0    insulin lispro (HUMALOG) 100 unit/mL injection, USE WITH INSULIN PUMP AS DIRECTED., Disp: 150 mL, Rfl: 0    nystatin (MYCOSTATIN) powder, Apply as directed, Disp: 60 Bottle, Rfl: 3    irbesartan (AVAPRO) 150 mg tablet, Take 1 Tab by mouth daily. , Disp: 90 Tab, Rfl: 3    atorvastatin (LIPITOR) 40 mg tablet, Take 1 Tab by mouth daily. , Disp: 90 Tab, Rfl: 3    lansoprazole (PREVACID) 30 mg capsule, Take 1 Cap by mouth Daily (before breakfast). , Disp: 90 Cap, Rfl: 3    traZODone (DESYREL) 50 mg tablet, Take 1 Tab by mouth nightly., Disp: 90 Tab, Rfl: 3    levothyroxine (SYNTHROID) 200 mcg tablet, Take 1 Tab by mouth Daily (before breakfast). , Disp: 90 Tab, Rfl: 3    levothyroxine (SYNTHROID) 50 mcg tablet, Take 1 Tab by mouth Daily (before breakfast). , Disp: 90 Tab, Rfl: 3    glucose blood VI test strips (ACCU-CHEK AMARA PLUS TEST STRP) strip, Check BS 10 Times Daily  DxE11.9, use with accu-check combo pump, Disp: 300 Strip, Rfl: 11    progesterone (PROMETRIUM) 100 mg capsule, Take 100 mg by mouth daily. , Disp: , Rfl:     fluticasone (FLONASE) 50 mcg/actuation nasal spray, 1 puff in each nostril twice daily, Disp: 1 Bottle, Rfl: 5    aspirin (ASPIRIN) 325 mg tablet, Take 325 mg by mouth daily. , Disp: , Rfl:     cyanocobalamin (VITAMIN B-12) 1,000 mcg tablet, Take 3,000 mcg by mouth daily. , Disp: , Rfl:     cholecalciferol, vitamin d3, 10,000 unit cap, Take  by mouth daily. , Disp: , Rfl:     calcium 600 mg cap, Take  by mouth daily. , Disp: , Rfl:     MULTIVITAMIN PO, Take  by mouth daily. , Disp: , Rfl:     fexofenadine (ALLEGRA) 180 mg tablet, Take 180 mg by mouth daily. Take / use AM day of surgery with a sip of water per anesthesia protocols. , Disp: , Rfl:      Date Last Reviewed:  5/18/2017     Number of Personal Factors/Comorbidities that affect the Plan of Care:     EXAMINATION:     Observation/Orthostatic Postural Assessment: Forward head posture, bilateral rounded shoulders, increased cervical lordosis  Palpation:          Tenderness to palpation in the cervical paraspinals, upper trapezius, and levators  ROM:    Joint: Eval Date: 4/17/2017 Re-Assess Date: Re-Assess Date:         Cervical AROM      Flexion (% of normal): 50 with tightness     Extension (% of normal): 50 with pain     L sidebending (% of normal): 50 with pain at end range     R sidebending (% of normal): 75 with tightness     L rotation (% of normal): 50 with pain at end range     R rotation (% of normal): 75 with tightness                 Pain at end-range or with AROM? Yes/No yes     Any pain with overpressure? Yes/No yes       Strength:    Joint: Eval Date:  Re-Assess Date:  Re-Assess Date:     RIGHT LEFT RIGHT LEFT RIGHT LEFT   Shoulder flexion:  5/5 4/5       Shoulder extension: 5/5 5/5       Shoulder abduction: 5/5 4/5       Shoulder IR: 5/5 5/5       Shoulder ER: 5/5 4/5       Elbow flexion: 5/5 4/5       Elbow extension: 5/5 4/5       Wrist flexion/extension: 5/5 4/5           Special Tests: Assessed @ Initial Visit    Spurling's Test: Positive     Neurological Screen: Radiating symptoms? Yes/No     Functional Mobility:  Assessed @ Initial Visit   Balance:  Assessed @ Initial Visit        Body Structures Involved:  1. Nerves  2. Bones  3. Joints  4. Muscles  5. Ligaments 6.  7.  8.  Body Functions Affected:  1. Sensory/Pain  2. Neuromusculoskeletal  3.  Movement Related Number of elements that affect the Plan of Care:     CLINICAL PRESENTATION:     Presentation:     CLINICAL DECISION MAKING:     Outcome Measure: Tool Used: Neck Disability Index (NDI)  Score:  Initial: 15/50  Most Recent: X/50 (Date: -- )   Interpretation of Score: The Neck Disability Index is a revised form of the Oswestry Low Back Pain Index and is designed to measure the activities of daily living in person's with neck pain. Each section is scored on a 0-5 scale, 5 representing the greatest disability. The scores of each section are added together for a total score of 50. Score 0 1-10 11-20 21-30 31-40 41-49 50   Modifier CH CI CJ CK CL CM CN       Medical Necessity:   · Skilled intervention continues to be required due to address above deficits affecting participation in basic ADLs and overall functional tolerance. Reason for Services/Other Comments:  · Patient continues to require skilled intervention due to address above deficits affecting participation in basic ADLs and overall functional tolerance. ·  ·    Use of outcome tool(s) and clinical judgement create a POC that gives a:    Clear prediction of patient's progress: LOW COMPLEXITY   TREATMENT:      (In addition to Assessment/Re-Assessment sessions the following treatments were rendered)  THERAPEUTIC EXERCISE: (15 minutes):  Exercises per grid below to improve mobility, strength, balance and coordination. Required minimal visual and verbal cues to promote proper body alignment and promote proper body posture. Progressed resistance, range, repetitions and complexity of movement as indicated.      Date:  5/9/2017 Date:  5/11/2017 Date:  5/16/2017   Activity/Exercise      Supine chin tucks x5' x5'    Doorway pec stretch      Sidelying trunk rotation      Patient education on sleep position with set up      ube x6' x6' x8'   TB rows      TB extensions      Joint repositioning with laser feed back x15' with multi angle tracking and jpet in horizontal plane     Seated cervical traction  x5'    Self soft tissue mobilization with ball   x7'               MANUAL THERAPY: (25 minutes): Joint mobilization, Soft tissue mobilization and Manipulation was utilized and necessary because of the patient's restricted joint motion, painful spasm, loss of articular motion and restricted motion of soft tissue. Soft tissue mobilization to bilateral upper traps and levators. Manual cervical distraction in supine. P/a mobilization to LC2-3, T1-2, T2-3, T3-4 grade III, IV-each level. Sustained trigger point release to bilateral upper trap. Physiologic cervical rotation to left, grade III. MODALITIES: ( 15 minutes): mechanical cervical traction in supine hooklying 16# max tension, 8# least tension. (Used abbreviations: MET - muscle energy technique; PNF - proprioceptive neuromuscular facilitation; NMR - neuromuscular re-education; AP - anterior to posterior; PA - posterior to anterior)    Pre-Treatment Assessment: Reports some improved ROM and decreased numbness. Treatment/Session Assessment:Patient reported improved arm pain after treatment. · Pain/ Symptoms: Initial:   4 Post Session:  2 ·   Compliance with Program/Exercises: Will assess as treatment progresses. · Recommendations/Intent for next treatment session: \"Next visit will focus on advancements to more challenging activities and reduction in assistance provided\".   Total Treatment Duration:  PT Patient Time In/Time Out  Time In: 1600  Time Out: [address], PT

## 2017-05-18 NOTE — PROGRESS NOTES
Mk Vazquez  : 1975 [facility] [facility]", [address]  [phone]   [phone]        OUTPATIENT DAILY NOTE    NAME/AGE/GENDER: Mk Vazquez is a 42 y.o. female. DATE: 2017    Patient cancelled for appointment today due to illness. Patient reports that her symptoms have fully resolved and will follow up if she has any additional problems.     Lc Fall, PT

## 2017-06-13 PROBLEM — Z96.41 INSULIN PUMP STATUS: Status: ACTIVE | Noted: 2017-06-13

## 2017-06-30 ENCOUNTER — HOSPITAL ENCOUNTER (OUTPATIENT)
Dept: DIABETES SERVICES | Age: 42
Discharge: HOME | End: 2017-06-30
Payer: COMMERCIAL

## 2017-06-30 VITALS — WEIGHT: 293 LBS | HEIGHT: 72 IN | BODY MASS INDEX: 39.68 KG/M2

## 2017-06-30 PROCEDURE — G0108 DIAB MANAGE TRN  PER INDIV: HCPCS

## 2017-06-30 NOTE — PROGRESS NOTES
Came for diabetes educational assessment today. Provided basic information on carbohydrates, proteins and fats. Educational need/plan: Will attend one individual session to address intense carbohydrate counting. Pt eats 17-18 meals/week at restaurants and school cafeteria.

## 2017-07-06 NOTE — PROGRESS NOTES
Marielle Brooks  : 1975 [facility] [address]  [phone]   [phone]       OUTPATIENT PHYSICAL THERAPY: Discharge  2017       ICD-10: Treatment Diagnosis: Cervicalgia (M54.2)   Precautions/Allergies:   Codeine and Zithromax [azithromycin]   Fall Risk Score: 1 (? 5 = High Risk)  MD Orders: Eval and Treat MEDICAL/REFERRING DIAGNOSIS:  Neck pain [M54.2]   DATE OF ONSET: four weeks ago  REFERRING PHYSICIAN: Ester Baldwin MD  RETURN PHYSICIAN APPOINTMENT: TBD by patient      INITIAL ASSESSMENT:  Ms. Marielle Brooks has attended 8 physical therapy session including initial evaluation. Marielle Brooks presented with improved symptoms and will be discharged from therapy at this time. PROBLEM LIST (Impacting functional limitations):  1. Decreased Strength  2. Decreased ADL/Functional Activities  3. Decreased Transfer Abilities  4. Decreased Balance  5. Increased Pain  6. Decreased Activity Tolerance  7. Decreased Work Simplification/Energy Conservation Techniques  8. Decreased Flexibility/Joint Mobility  9. Decreased Knowledge of Precautions  10. Decreased [place] with Home Exercise Program INTERVENTIONS PLANNED:  1. Bed Mobility  2. Cold  3. Cryotherapy  4. Heat  5. Home Exercise Program (HEP)  6. Manual Therapy  7. Neuromuscular Re-education/Strengthening  8. Range of Motion (ROM)  9. Therapeutic Activites  10. Therapeutic Exercise/Strengthening  11. Transfer Training   TREATMENT PLAN:  Effective Dates: 2017 TO 2017. Frequency/Duration: 2 times a week for 6 weeks  GOALS: (Goals have been discussed and agreed upon with patient.)  SHORT-TERM FUNCTIONAL GOALS: Time Frame: 4 weeks  1. Marielle Brooks will report <=4/10 pain to cervical spine with performance of functional spinal mobility and rotation. met  2.   Marielle Brooks will demonstrate improved NDI score, indicating spinal improvement from 15/50 to 10/50 affecting minimal to no difficulty with performance of cervical mobility and strengthening. Met  3. Pawel Penn to be independent with initial HEP for cervical region and UE's. Met  4. Pawel Penn will improve ROM to >=90% to assist with improved function during instrumental activities of daily living. Met  5. Pawel Penn will improve MMT to >=4+/5 to all UE strengthening to return to PLOF and improve functional tolerance. Met    DISCHARGE GOALS: Time Frame: 8 weeks  1. Pawel Penn will report <=2/10 pain to cervical spine with performance of functional spinal mobility and rotation and minimal to no difficulty with such tasks. Met  2. Pawel Penn will demonstrate improved NDI score, indicating spinal improvement from 10/50 to <=5/50 affecting minimal to no difficulty with performance of cervical mobility and strengthening. Met  3. Pawel Penn to be independent with advanced HEP for cervical region and UE's. Met    Rehabilitation Potential For Stated Goals: Good  Regarding Hortencia Aranda's therapy, I certify that the treatment plan above will be carried out by a therapist or under their direction. Thank you for this referral,  Gavin Reina PT     Referring Physician Signature: Salima Tejeda MD              Date                    HISTORY:     History of Present Injury/Illness (Reason for Referral):  Mrs. BERRY [facility] reports onset of neck pain after waking about a month ago. She reports that her pain has persisted since that time and led to radiating pain in the left >right shoulder blade, bilateral upper traps, and has led to nightly sleep disturbance. Her symptoms radiate to the left thumb and first two fingers and her neck pain is increased with continued work duties. Her goals are to improve her neck pain and progress to prior level of function at work and to decrease her sleep disturbance.   Past Medical History/Comorbidities:   Ms. BERRY [facility]  has a past medical history of Abscess in lower abdominal wall (10/25/2013); Arthritis; Asthma; Folliculitis (12/55/2490); GERD (gastroesophageal reflux disease); Hyperlipidemia; Hypertension; Insomnia (10/25/2013); Left knee pain (10/25/2013); Morbid obesity ([address] ARBEN (obstructive sleep apnea); Palpitation (10/25/2013); Paronychia (10/25/2013); Primary hypothyroidism; RA (rheumatoid arthritis) ([address] Rhinitis (10/25/2013); Shoulder pain, probably musculokeletal (10/25/2013); Type 1 diabetes mellitus (HCC) (Age 15); and Uterine fibroid. She also has no past medical history of Aneurysm ([address] Arrhythmia; Autoimmune disease ([address] CAD (coronary artery disease); Cancer ([address] Chronic kidney disease; Chronic obstructive pulmonary disease ([address] Chronic pain; Coagulation defects; Coagulation disorder ([address] COPD; Difficult intubation; Endocarditis; Heart failure ([address] Liver disease; Malignant hyperthermia due to anesthesia; Nicotine vapor product user; Non-nicotine vapor product user; Other ill-defined conditions; Pseudocholinesterase deficiency; Psychiatric disorder; PUD (peptic ulcer disease); Rheumatic fever; Seizures ([facility] Stroke [facility]); or Thromboembolus ([address] Ms. Jessica Orozco  has a past surgical history that includes cholecystectomy; hysterectomy (2016); heent; and sinus surgery proc unlisted. Social History/Living Environment:     lives with  in private residence. Prior Level of Function/Work/Activity:  Works as a   at an elementary school    Current Medications:    Current Outpatient Prescriptions:     ACCU-CHEK AMARA PLUS TEST STRP strip, 8-10 times per day, Disp: 900 Strip, Rfl: 3    LANTUS 100 unit/mL injection, 60 Units by SubCUTAneous route daily. For insulin pump failure, Disp: 1 Vial, Rfl: 0    irbesartan (AVAPRO) 150 mg tablet, Take 1 Tab by mouth daily. , Disp: 90 Tab, Rfl: 3    levothyroxine (SYNTHROID) 200 mcg tablet, Take 1 Tab by mouth Daily (before breakfast). , Disp: 90 Tab, Rfl: 3    levothyroxine (SYNTHROID) 75 mcg tablet, Take 1 Tab by mouth Daily (before breakfast). , Disp: 90 Tab, Rfl: 3    atorvastatin (LIPITOR) 40 mg tablet, Take 1 Tab by mouth daily. , Disp: 90 Tab, Rfl: 3    Pump Set misc, MN 3.4, 3a 3.8, 8a 2.6; ICR 4; ISF 10; Target 120-140; IOB 4 hr, Disp: 1 Each, Rfl: 0    HUMALOG 100 unit/mL injection, Up to 200 units per day per insulin pump, Disp: 18 Vial, Rfl: 3    nabumetone (RELAFEN) 500 mg tablet, Take 1 Tab by mouth two (2) times a day., Disp: 60 Tab, Rfl: 0    dorzolamide-timolol (COSOPT) 22.3-6.8 mg/mL ophthalmic solution, Administer 1 Drop to both eyes two (2) times a day., Disp: , Rfl:     montelukast (SINGULAIR) 10 mg tablet, TAKE ONE TABLET BY MOUTH DAILY (IN THE EVENING). , Disp: 90 Tab, Rfl: 0    nystatin (MYCOSTATIN) powder, Apply as directed, Disp: 60 Bottle, Rfl: 3    lansoprazole (PREVACID) 30 mg capsule, Take 1 Cap by mouth Daily (before breakfast). , Disp: 90 Cap, Rfl: 3    traZODone (DESYREL) 50 mg tablet, Take 1 Tab by mouth nightly., Disp: 90 Tab, Rfl: 3    progesterone (PROMETRIUM) 100 mg capsule, Take 100 mg by mouth daily. , Disp: , Rfl:     fluticasone (FLONASE) 50 mcg/actuation nasal spray, 1 puff in each nostril twice daily, Disp: 1 Bottle, Rfl: 5    aspirin (ASPIRIN) 325 mg tablet, Take 325 mg by mouth daily. , Disp: , Rfl:     cyanocobalamin (VITAMIN B-12) 1,000 mcg tablet, Take 3,000 mcg by mouth daily. , Disp: , Rfl:     cholecalciferol, vitamin d3, 10,000 unit cap, Take  by mouth daily. , Disp: , Rfl:     calcium 600 mg cap, Take  by mouth daily. , Disp: , Rfl:     MULTIVITAMIN PO, Take  by mouth daily. , Disp: , Rfl:     fexofenadine (ALLEGRA) 180 mg tablet, Take 180 mg by mouth daily. Take / use AM day of surgery with a sip of water per anesthesia protocols. , Disp: , Rfl:      Date Last Reviewed:  7/6/2017     Number of Personal Factors/Comorbidities that affect the Plan of Care:     EXAMINATION:     Observation/Orthostatic Postural Assessment: Forward head posture, bilateral rounded shoulders, increased cervical lordosis  Palpation:          Tenderness to palpation in the cervical paraspinals, upper trapezius, and levators  ROM:    Joint: Eval Date: 4/17/2017 Re-Assess Date: Re-Assess Date:         Cervical AROM      Flexion (% of normal): 50 with tightness     Extension (% of normal): 50 with pain     L sidebending (% of normal): 50 with pain at end range     R sidebending (% of normal): 75 with tightness     L rotation (% of normal): 50 with pain at end range     R rotation (% of normal): 75 with tightness                 Pain at end-range or with AROM? Yes/No yes     Any pain with overpressure? Yes/No yes       Strength:    Joint: Eval Date:  Re-Assess Date:  Re-Assess Date:     RIGHT LEFT RIGHT LEFT RIGHT LEFT   Shoulder flexion:  5/5 5/5       Shoulder extension: 5/5 5/5       Shoulder abduction: 5/5 5/5       Shoulder IR: 5/5 5/5       Shoulder ER: 5/5 5/5       Elbow flexion: 5/5 5/5       Elbow extension: 5/5 5/5       Wrist flexion/extension: 5/5 5/5           Special Tests: Assessed @ Initial Visit    Spurling's Test: Positive     Neurological Screen: Radiating symptoms? Yes/No     Functional Mobility:  Assessed @ Initial Visit   Balance:  Assessed @ Initial Visit        Body Structures Involved:  1. Nerves  2. Bones  3. Joints  4. Muscles  5. Ligaments 6.  7.  8.  Body Functions Affected:  1. Sensory/Pain  2. Neuromusculoskeletal  3. Movement Related   Number of elements that affect the Plan of Care:     CLINICAL PRESENTATION:     Presentation:     CLINICAL DECISION MAKING:     Outcome Measure: Tool Used: Neck Disability Index (NDI)  Score:  Initial: 15/50  Most Recent: 2/50 (Date: -- )   Interpretation of Score: The Neck Disability Index is a revised form of the Oswestry Low Back Pain Index and is designed to measure the activities of daily living in person's with neck pain.   Each section is scored on a 0-5 scale, 5 representing the greatest disability. The scores of each section are added together for a total score of 50. Score 0 1-10 11-20 21-30 31-40 41-49 50   Modifier CH CI CJ CK CL CM CN       Medical Necessity:   · Skilled intervention continues to be required due to address above deficits affecting participation in basic ADLs and overall functional tolerance. Reason for Services/Other Comments:  · Patient continues to require skilled intervention due to address above deficits affecting participation in basic ADLs and overall functional tolerance.  ·  ·    Use of outcome tool(s) and clinical judgement create a POC that gives a:    Clear prediction of patient's progress: LOW COMPLEXITY                        Clearence Michael, PT

## 2017-07-07 ENCOUNTER — HOSPITAL ENCOUNTER (OUTPATIENT)
Dept: DIABETES SERVICES | Age: 42
Discharge: HOME | End: 2017-07-07
Attending: INTERNAL MEDICINE
Payer: COMMERCIAL

## 2017-07-07 PROCEDURE — G0108 DIAB MANAGE TRN  PER INDIV: HCPCS

## 2017-07-07 NOTE — PROGRESS NOTES
Attended nutrition diabetes 1:1 session today. Topics included:  intense carbohydrate counting (emphasizing high fiber carbohydrates); proteins (emphasizing heart healthy choices) and fat food choices (emphasizing unsaturated fats); free foods. Reviewed the additional bolus options available with her pump: extended and multi wave. Demonstrated understanding of material covered. Anticipated adherence is good. Problems/barriers may be: pt does not know her carbohydrate to insulin ratio and it is not able to be programmed into her Accucheck Spirit pump. Encouraged pt to discuss with her MD what her carb:insulin ratio should be. Pt's goal is to start intense carbohydrate counting, starting today, for every meal and continuing daily. Plan for follow up is : has RD name and phone number for f/u questions/concerns.

## 2018-02-21 PROBLEM — E06.3 HASHIMOTO'S THYROIDITIS: Status: ACTIVE | Noted: 2018-02-21

## 2019-12-23 PROBLEM — E10.42 CONTROLLED TYPE 1 DIABETES MELLITUS WITH DIABETIC POLYNEUROPATHY (HCC): Status: ACTIVE | Noted: 2019-12-23

## 2021-06-04 ENCOUNTER — HOSPITAL ENCOUNTER (OUTPATIENT)
Dept: LAB | Age: 46
Discharge: HOME | End: 2021-06-04
Payer: COMMERCIAL

## 2021-06-04 LAB
ALBUMIN SERPL-MCNC: 3.8 G/DL (ref 3.5–5)
ALBUMIN/GLOB SERPL: 1.1 {RATIO} (ref 1.2–3.5)
ALP SERPL-CCNC: 121 U/L (ref 50–136)
ALT SERPL-CCNC: 50 U/L (ref 12–65)
ANION GAP SERPL CALC-SCNC: 6 MMOL/L (ref 7–16)
AST SERPL-CCNC: 22 U/L (ref 15–37)
BILIRUB SERPL-MCNC: 0.6 MG/DL (ref 0.2–1.1)
BUN SERPL-MCNC: 18 MG/DL (ref 6–23)
CALCIUM SERPL-MCNC: 8.7 MG/DL (ref 8.3–10.4)
CHLORIDE SERPL-SCNC: 105 MMOL/L (ref 98–107)
CHOLEST SERPL-MCNC: 152 MG/DL
CO2 SERPL-SCNC: 31 MMOL/L (ref 21–32)
CREAT SERPL-MCNC: 0.81 MG/DL (ref 0.6–1)
CREAT UR-MCNC: 128 MG/DL
EST. AVERAGE GLUCOSE BLD GHB EST-MCNC: 163 MG/DL
GLOBULIN SER CALC-MCNC: 3.4 G/DL (ref 2.3–3.5)
GLUCOSE SERPL-MCNC: 77 MG/DL (ref 65–100)
HBA1C MFR BLD: 7.3 % (ref 4.2–6.3)
HDLC SERPL-MCNC: 73 MG/DL (ref 40–60)
HDLC SERPL: 2.1 {RATIO}
LDLC SERPL CALC-MCNC: 65.2 MG/DL
MICROALBUMIN UR-MCNC: 1.05 MG/DL
MICROALBUMIN/CREAT UR-RTO: 8 MG/G
POTASSIUM SERPL-SCNC: 3.3 MMOL/L (ref 3.5–5.1)
PROT SERPL-MCNC: 7.2 G/DL (ref 6.3–8.2)
SODIUM SERPL-SCNC: 142 MMOL/L (ref 136–145)
T3 SERPL-MCNC: 0.96 NG/ML (ref 0.6–1.81)
T4 FREE SERPL-MCNC: 1.6 NG/DL (ref 0.78–1.46)
TRIGL SERPL-MCNC: 69 MG/DL (ref 35–150)
TSH W FREE THYROID I,TSHELE: 0.23 UIU/ML (ref 0.36–3.74)
VLDLC SERPL CALC-MCNC: 13.8 MG/DL (ref 6–23)

## 2021-06-04 PROCEDURE — 84443 ASSAY THYROID STIM HORMONE: CPT

## 2021-06-04 PROCEDURE — 80061 LIPID PANEL: CPT

## 2021-06-04 PROCEDURE — 36415 COLL VENOUS BLD VENIPUNCTURE: CPT

## 2021-06-04 PROCEDURE — 83036 HEMOGLOBIN GLYCOSYLATED A1C: CPT

## 2021-06-04 PROCEDURE — 84480 ASSAY TRIIODOTHYRONINE (T3): CPT

## 2021-06-04 PROCEDURE — 80053 COMPREHEN METABOLIC PANEL: CPT

## 2021-06-04 PROCEDURE — 82043 UR ALBUMIN QUANTITATIVE: CPT

## 2021-06-04 PROCEDURE — 84439 ASSAY OF FREE THYROXINE: CPT

## 2022-02-17 PROBLEM — E66.01 CLASS 2 SEVERE OBESITY DUE TO EXCESS CALORIES WITH SERIOUS COMORBIDITY AND BODY MASS INDEX (BMI) OF 39.0 TO 39.9 IN ADULT (HCC): Status: ACTIVE | Noted: 2022-02-17

## 2022-03-18 PROBLEM — E66.812 CLASS 2 SEVERE OBESITY DUE TO EXCESS CALORIES WITH SERIOUS COMORBIDITY AND BODY MASS INDEX (BMI) OF 39.0 TO 39.9 IN ADULT: Status: ACTIVE | Noted: 2022-02-17

## 2022-03-18 PROBLEM — E66.01 CLASS 2 SEVERE OBESITY DUE TO EXCESS CALORIES WITH SERIOUS COMORBIDITY AND BODY MASS INDEX (BMI) OF 39.0 TO 39.9 IN ADULT (HCC): Status: ACTIVE | Noted: 2022-02-17

## 2022-03-19 PROBLEM — E06.3 HASHIMOTO'S THYROIDITIS: Status: ACTIVE | Noted: 2018-02-21

## 2022-03-19 PROBLEM — Z96.41 INSULIN PUMP STATUS: Status: ACTIVE | Noted: 2017-06-13

## 2022-03-20 PROBLEM — E10.42 CONTROLLED TYPE 1 DIABETES MELLITUS WITH DIABETIC POLYNEUROPATHY (HCC): Status: ACTIVE | Noted: 2019-12-23

## 2022-05-26 DIAGNOSIS — E78.2 MIXED HYPERLIPIDEMIA: ICD-10-CM

## 2022-05-26 DIAGNOSIS — E03.9 PRIMARY HYPOTHYROIDISM: Primary | ICD-10-CM

## 2022-07-08 ENCOUNTER — OFFICE VISIT (OUTPATIENT)
Dept: ENDOCRINOLOGY | Age: 47
End: 2022-07-08
Payer: COMMERCIAL

## 2022-07-08 VITALS
SYSTOLIC BLOOD PRESSURE: 124 MMHG | WEIGHT: 293 LBS | HEIGHT: 72 IN | OXYGEN SATURATION: 97 % | HEART RATE: 83 BPM | BODY MASS INDEX: 39.68 KG/M2 | DIASTOLIC BLOOD PRESSURE: 66 MMHG

## 2022-07-08 DIAGNOSIS — E10.42 CONTROLLED TYPE 1 DIABETES MELLITUS WITH DIABETIC POLYNEUROPATHY (HCC): Primary | ICD-10-CM

## 2022-07-08 DIAGNOSIS — E78.2 MIXED HYPERLIPIDEMIA: ICD-10-CM

## 2022-07-08 DIAGNOSIS — E03.9 PRIMARY HYPOTHYROIDISM: ICD-10-CM

## 2022-07-08 DIAGNOSIS — Z96.41 INSULIN PUMP STATUS: ICD-10-CM

## 2022-07-08 DIAGNOSIS — I10 ESSENTIAL HYPERTENSION: ICD-10-CM

## 2022-07-08 LAB — HBA1C MFR BLD: 6.5 %

## 2022-07-08 PROCEDURE — 95251 CONT GLUC MNTR ANALYSIS I&R: CPT | Performed by: INTERNAL MEDICINE

## 2022-07-08 PROCEDURE — 99214 OFFICE O/P EST MOD 30 MIN: CPT | Performed by: INTERNAL MEDICINE

## 2022-07-08 PROCEDURE — 83036 HEMOGLOBIN GLYCOSYLATED A1C: CPT | Performed by: INTERNAL MEDICINE

## 2022-07-08 RX ORDER — INSULIN GLARGINE 100 [IU]/ML
80 INJECTION, SOLUTION SUBCUTANEOUS DAILY
Qty: 10 ML | Refills: 1 | Status: SHIPPED | OUTPATIENT
Start: 2022-07-08

## 2022-07-08 RX ORDER — DAPAGLIFLOZIN 5 MG/1
5 TABLET, FILM COATED ORAL DAILY
Qty: 90 TABLET | Refills: 3 | Status: SHIPPED | OUTPATIENT
Start: 2022-07-08

## 2022-07-08 RX ORDER — UBIDECARENONE 100 MG
CAPSULE ORAL
COMMUNITY

## 2022-07-08 RX ORDER — INSULIN PUMP CARTRIDGE
CARTRIDGE (EA) SUBCUTANEOUS
COMMUNITY
Start: 2022-05-12

## 2022-07-08 RX ORDER — DOXYCYCLINE HYCLATE 100 MG/1
CAPSULE ORAL
COMMUNITY
Start: 2022-07-01

## 2022-07-08 RX ORDER — LEVOTHYROXINE SODIUM 0.2 MG/1
200 TABLET ORAL
Qty: 90 TABLET | Refills: 3 | Status: SHIPPED | OUTPATIENT
Start: 2022-07-08

## 2022-07-08 RX ORDER — IRBESARTAN 75 MG/1
75 TABLET ORAL DAILY
Qty: 90 TABLET | Refills: 3 | Status: SHIPPED | OUTPATIENT
Start: 2022-07-08

## 2022-07-08 RX ORDER — FLUTICASONE PROPIONATE 50 MCG
SPRAY, SUSPENSION (ML) NASAL
COMMUNITY
Start: 2022-04-07

## 2022-07-08 RX ORDER — TRIAMCINOLONE ACETONIDE 1 MG/G
CREAM TOPICAL
COMMUNITY
Start: 2022-04-07 | End: 2022-07-08

## 2022-07-08 RX ORDER — INFUSION SET FOR INSULIN PUMP
INFUSION SETS-PARAPHERNALIA MISCELLANEOUS
COMMUNITY
Start: 2022-05-12

## 2022-07-08 RX ORDER — SEMAGLUTIDE 1.34 MG/ML
INJECTION, SOLUTION SUBCUTANEOUS
COMMUNITY
Start: 2022-05-22 | End: 2022-07-08 | Stop reason: SDUPTHER

## 2022-07-08 RX ORDER — BLOOD-GLUCOSE TRANSMITTER
EACH MISCELLANEOUS
Qty: 1 EACH | Refills: 3
Start: 2022-07-08

## 2022-07-08 RX ORDER — FLUTICASONE PROPIONATE 0.05 %
CREAM (GRAM) TOPICAL
COMMUNITY
Start: 2022-04-07

## 2022-07-08 RX ORDER — CURCUMIN 100 %
POWDER (GRAM) MISCELLANEOUS
COMMUNITY

## 2022-07-08 RX ORDER — METHOCARBAMOL 500 MG/1
TABLET, FILM COATED ORAL
COMMUNITY
Start: 2022-04-05 | End: 2022-07-08

## 2022-07-08 RX ORDER — POTASSIUM CHLORIDE 20 MEQ/1
TABLET, EXTENDED RELEASE ORAL
COMMUNITY
Start: 2022-05-16

## 2022-07-08 RX ORDER — FLUCONAZOLE 200 MG/1
TABLET ORAL
COMMUNITY
Start: 2022-07-01

## 2022-07-08 RX ORDER — AZELASTINE 1 MG/ML
SPRAY, METERED NASAL
COMMUNITY
Start: 2022-04-07

## 2022-07-08 RX ORDER — LEVOTHYROXINE SODIUM 0.03 MG/1
12.5 TABLET ORAL
Qty: 45 TABLET | Refills: 3 | Status: SHIPPED | OUTPATIENT
Start: 2022-07-08

## 2022-07-08 RX ORDER — BLOOD-GLUCOSE SENSOR
EACH MISCELLANEOUS
Qty: 3 EACH | Refills: 3
Start: 2022-07-08

## 2022-07-08 RX ORDER — SEMAGLUTIDE 1.34 MG/ML
1 INJECTION, SOLUTION SUBCUTANEOUS
Qty: 9 ML | Refills: 3 | Status: SHIPPED | OUTPATIENT
Start: 2022-07-08

## 2022-07-08 RX ORDER — SUBCUTANEOUS INSULIN PUMP
EACH MISCELLANEOUS
Qty: 1 EACH | Refills: 0
Start: 2022-07-08

## 2022-07-08 RX ORDER — MELOXICAM 7.5 MG/1
TABLET ORAL
COMMUNITY
Start: 2022-05-07 | End: 2022-07-08

## 2022-07-08 RX ORDER — MV-MINS NO.73/IRON FUM/FOLIC 106 MG-1MG
CAPSULE ORAL
COMMUNITY
Start: 2022-06-24

## 2022-07-08 RX ORDER — MAGNESIUM OXIDE TAB 400 MG (241.3 MG ELEMENTAL MG) 400 (241.3 MG) MG
TAB ORAL
COMMUNITY
Start: 2022-05-13

## 2022-07-08 ASSESSMENT — ENCOUNTER SYMPTOMS
CONSTIPATION: 0
DIARRHEA: 0

## 2022-07-08 NOTE — PROGRESS NOTES
Gary Damico MD, [facility]  [address], [address] [facility], [address] [place] [address]  Phone [phone]  Facsimile [phone]          Jeevan Barragan is a 51 y.o. female seen 7/8/2022 for follow up of type 1 diabetes mellitus and hypothyroidism        ASSESSMENT AND PLAN:    1. Controlled type 1 diabetes mellitus with diabetic polyneuropathy ([address]  She has a long-standing history of type 1 diabetes mellitus complicated by diabetic peripheral neuropathy and retinopathy.  Urine microalbumin negative 4/2022.  Eye exam 7/2021 revealed stable mild nonproliferative diabetic retinopathy.     - LANTUS 100 UNIT/ML injection vial; Inject 80 Units into the skin daily For insulin pump failure  Dispense: 10 mL; Refill: 1  - Continuous Blood Gluc Sensor (DEXCOM G6 SENSOR) MISC; Change every 10 days  Dispense: 3 each; Refill: 3  - Continuous Blood Gluc Transmit (DEXCOM G6 TRANSMITTER) MISC; Change every 3 months  Dispense: 1 each; Refill: 3    2. Insulin pump status  Her overall glycemic control is pretty good and her hemoglobin A1c is at goal less than 7.    - Insulin Infusion Pump (T:SLIM X2 INS PUMP/CONTROL-IQ) [place]; Basal rates: MN 4.2, 3a 4.5, 8a 3.0, 11a 3.1; 5p 3.1; ICR MN 3, 3a 3, 8a 3, 11a 2.5, 5p 1.5; ISF 10; Target 120; IOB 5 hr  Dispense: 1 each; Refill: 0  - HUMALOG 100 UNIT/ML injection vial; Up to 200 units per day per insulin pump  Dispense: 180 mL; Refill: 3  - FARXIGA 5 MG tablet; Take 1 tablet by mouth daily  Dispense: 90 tablet; Refill: 3  - OZEMPIC, 1 MG/DOSE, 4 MG/3ML SOPN; Inject 1 mg into the skin every 7 days  Dispense: 9 mL; Refill: 3    3. Primary hypothyroidism  Biochemically euthyroid 4/2022.    - levothyroxine (SYNTHROID) 200 MCG tablet; Take 1 tablet by mouth every morning (before breakfast)  Dispense: 90 tablet; Refill: 3  - levothyroxine (SYNTHROID) 25 MCG tablet;  Take 0.5 tablets by mouth every morning (before breakfast)  Dispense: 45 tablet; Refill: 3    4. Essential hypertension  Blood pressure at goal less than 140/90.    - irbesartan (AVAPRO) 75 MG tablet; Take 1 tablet by mouth daily  Dispense: 90 tablet; Refill: 3    5. Mixed hyperlipidemia  Lipids at goal 4/2022. She states that her primary care physician stopped her atorvastatin for reasons that are unclear to me. I will defer to her primary care physician.              Procedures:     Interpretation of 72 hour glucose monitor: At least 72 hours of data were reviewed. The patient utilizes a Dexcom G6 continuous glucose monitoring system. The average glucose during the reviewed timeframe was 156 (time in range 75%, above target 22%, below target 100 2%). There are no obvious patterns to target. Follow-up and Dispositions    · Return in about 4 months (around 11/8/2022). HISTORY OF PRESENT ILLNESS:    DIABETES MELLITUS     Diagnosis: Type 1 diabetes mellitus diagnosed age 15 (10/30/2018: Glucose 251, C-peptide <0.1.  8/9/2019: Glucose 231, C-peptide <0.1).    Diet: Carbohydrate counts.     Exercise: No regular exercise.     Monitoring: Accu-Chek Shannen and Dexcom G6 - 6-8 times per day.      Blood Glucose: By review of Dexcom download: Average 159, range  (time in range 100-180 75%, above target >180 22%, below target <100 3%.       Hypoglycemia: +Hypoglycemic awareness.  She reports rare hypoglycemia.       Hemoglobin A1c:  2/20/2017: 9.5.  4/27/2017: 9.4.  9/8/2017: 9.4.  2/21/2018: 8.6.  10/29/2018: 8.6.  3/11/2019: 8.0.  4/23/2019: 8.0.  8/9/2019: 8.2.  10/29/2019: 6.6.  5/8/2020: 7.7.  8/4/2020: 7.7.  6/4/2021: 7.3.  9/8/2021: 7.5.  2/2/2022: 7.2.  4/7/2022: 7.5.  7/8/2022: 6.5 (she is on iron replacement).      Microalbumin/Nephropathy:  2/20/2017: Creatinine 0.72 (), urine microalbumin-POC 20.  4/27/2017: Creatinine 0.83 (GFR 87).   10/30/2018: Creatinine 1.00 (GFR 69), urine microalbumin/creatinine <2.9.  4/23/2019: Creatinine 0.87 (GFR 81), urine microalbumin-POC 20.  8/9/2019: Creatinine 0.74 (GFR 99), urine microalbumin/creatinine <6.8.  10/29/2019: Creatinine 0.95 (GFR 73). 5/8/2020: Creatinine 0.79 (GFR 91), urine microalbumin/creatinine <4.  8/4/2020: Creatinine 0.75 (GFR 97), urine microalbumin/creatinine <7.  6/4/2021: Creatinine 0.81 (GFR >60), urine microalbumin/creatinine 8.  8/6/2021: Creatinine 1.12 (GFR 59), urine microalbumin/creatinine-POC <30.    4/7/2022: Urine microalbumin/creatinine-POC <30.  7/1/2022: Creatinine 0.84 (GFR 86). Neuropathy: +Diabetic peripheral neuropathy with mild numbness of feet.  She also has some shooting pains in her toes.     Retinopathy: Eye exam 7/2021 (Tasneem Scherer) - mild NPDR retinopathy OU without macular edema.     Lipids: She states that her PCP stopped her atorvastatin 40 mg daily. 2/20/2017: Total cholesterol 146, triglycerides 110, HDL 61, LDL 63, VLDL 22.  4/27/2017: Total cholesterol 145, triglycerides 116, HDL 56, LDL 66, VLDL 23.  10/30/2018: Total cholesterol 147, triglycerides 151, HDL 49, LDL 68, VLDL 30.  8/9/2019: Total cholesterol 158, triglycerides 100, HDL 59, LDL 79, VLDL 20.  5/8/2020: Total cholesterol 157, triglycerides 109, HDL 60, LDL 75, VLDL 22.  8/4/2020: Total cholesterol 154, triglycerides 136, HDL 61, LDL 66, VLDL 27.  6/4/2021: Total cholesterol 152, triglycerides 69, HDL 73, LDL 65.2, VLDL 13.8.  8/6/2021: Total cholesterol 146, triglycerides 177, HDL 59, LDL 52, VLDL 35.  4/7/2022: Total cholesterol 143, triglycerides 84, HDL 56, LDL 70, VLDL 17.     Pump: Tandem t:slim with Control IQ started 12/2021.     Pump issues: None.     Pump settings:  Total daily basal dose: 78.53 units.     Other treatment: Farxiga and Ozempic.          THYROID DISEASE     Presentation/Diagnosis: Hypothyroidism secondary to Hashimoto's thyroiditis diagnosed around 2010.     Symptoms: See review of systems.     Treatment: Takes generic in AM correctly.     Imaging: None.     Labs:  2/20/2017: TSH 5.780, total T4 8.1.  4/27/2017: TSH 5.460, total T4 11.3, T3 uptake 31, free thyroxine index 3.5.  9/8/2017: TSH 0.994, thyroid peroxidase antibodies 504.  10/30/2018: TSH 3.130.  4/23/2019: TSH 2.160.  8/9/2019: TSH 3.130.  10/29/2019: TSH 1.750.  5/8/2020: TSH 0.060, free T4 2.30.  8/4/2020: TSH 0.957, free T4 1.62.  6/4/2021: TSH 0.23, free T4 1.6, total T3 0.96.  8/6/2021: TSH 1.104.  4/7/2022: TSH 2.081, free T4 1. 29.     Pregnancy status: Status post hysterectomy. Review of Systems   Constitutional: Positive for diaphoresis (chronic night sweats). Negative for fatigue. Weight increased 8 pounds. Cardiovascular: Negative for palpitations. Gastrointestinal: Negative for constipation and diarrhea. Endocrine: Negative for cold intolerance. She reports some hot flashes. Neurological: Negative for tremors. Vital Signs:  /66   Pulse 83   Ht 6' 1\" (1.854 m)   Wt (!) 308 lb (139.7 kg)   SpO2 97%   BMI 40.64 kg/m²     Wt Readings from Last 3 Encounters:   07/08/22 (!) 308 lb (139.7 kg)   02/02/22 300 lb (136.1 kg)       Physical Exam  Constitutional:       General: She is not in acute distress. Neck:      Thyroid: No thyroid mass or thyromegaly. Cardiovascular:      Rate and Rhythm: Normal rate and regular rhythm. Comments: DP pulses 2+ bilaterally. No edema. Lymphadenopathy:      Cervical: No cervical adenopathy. Skin:     Comments: Calluses on bilateral 1st toes medially without ulcers. Neurological:      Motor: No tremor. Comments: Monofilament decreased. Ankle reflexes absent.          Orders Placed This Encounter   Procedures    AMB POC HEMOGLOBIN A1C    ME CONTINUOUS GLUCOSE MONITORING ANALYSIS I&R       Current Outpatient Medications   Medication Sig Dispense Refill    azelastine (ASTELIN) 0.1 % nasal spray USE 2 SPRAYS IN EACH NOSTRIL TWICE DAILY      coenzyme Q10 100 MG CAPS capsule Take by mouth      doxycycline hyclate (VIBRAMYCIN) 100 MG capsule       Fe Fum-FA-B Prj-S-Gw-Mg-Mn-Cu (HEMOCYTE PLUS) 106-1 MG CAPS TAKE 1 CAPSULE BY MOUTH DAILY      fluconazole (DIFLUCAN) 200 MG tablet TAKE 1 TABLET BY MOUTH DAILY      fluticasone (FLONASE) 50 MCG/ACT nasal spray SHAKE LIQUID AND USE 1 SPRAY IN EACH NOSTRIL TWICE DAILY AS NEEDED      fluticasone (CUTIVATE) 0.05 % cream APPLY TOPICALLY TO FACE TWICE DAILY AS NEEDED FOR FLAKING OR ITCHING      Insulin Infusion Pump Supplies (AUTOSOFT 90 INFUSION SET) MISC       Insulin Infusion Pump Supplies (T:SLIM X2 3ML CARTRIDGE) MISC       MAGNESIUM-OXIDE 400 (240 Mg) MG tablet       potassium chloride (KLOR-CON M) 20 MEQ extended release tablet       TURMERIC PO Take by mouth      Turmeric, Curcuma Longa, (CURCUMIN) POWD by Does not apply route      Multiple Vitamins-Minerals (HAIR SKIN AND NAILS FORMULA PO) Take by mouth      LANTUS 100 UNIT/ML injection vial Inject 80 Units into the skin daily For insulin pump failure 10 mL 1    Continuous Blood Gluc Sensor (DEXCOM G6 SENSOR) MISC Change every 10 days 3 each 3    Continuous Blood Gluc Transmit (DEXCOM G6 TRANSMITTER) MISC Change every 3 months 1 each 3    Insulin Infusion Pump (T:SLIM X2 INS PUMP/CONTROL-IQ) [place] Basal rates: MN 4.2, 3a 4.5, 8a 3.0, 11a 3.1; 5p 3.1; ICR MN 3, 3a 3, 8a 3, 11a 2.5, 5p 1.5; ISF 10;  Target 120; IOB 5 hr 1 each 0    HUMALOG 100 UNIT/ML injection vial Up to 200 units per day per insulin pump 180 mL 3    FARXIGA 5 MG tablet Take 1 tablet by mouth daily 90 tablet 3    OZEMPIC, 1 MG/DOSE, 4 MG/3ML SOPN Inject 1 mg into the skin every 7 days 9 mL 3    levothyroxine (SYNTHROID) 200 MCG tablet Take 1 tablet by mouth every morning (before breakfast) 90 tablet 3    levothyroxine (SYNTHROID) 25 MCG tablet Take 0.5 tablets by mouth every morning (before breakfast) 45 tablet 3    irbesartan (AVAPRO) 75 MG tablet Take 1 tablet by mouth daily 90 tablet 3    Multiple Vitamin (MULTIVITAMIN PO) Take by mouth daily      aspirin 325

## 2022-08-05 RX ORDER — INFUSION SET FOR INSULIN PUMP
INFUSION SETS-PARAPHERNALIA MISCELLANEOUS
Qty: 40 EACH | Refills: 10 | OUTPATIENT
Start: 2022-08-05

## 2022-08-05 RX ORDER — INSULIN PUMP CARTRIDGE
CARTRIDGE (EA) SUBCUTANEOUS
Refills: 10 | OUTPATIENT
Start: 2022-08-05

## 2022-08-09 RX ORDER — INSULIN PUMP CARTRIDGE
CARTRIDGE (EA) SUBCUTANEOUS
Refills: 3 | OUTPATIENT
Start: 2022-08-09

## 2022-08-09 RX ORDER — INFUSION SET FOR INSULIN PUMP
INFUSION SETS-PARAPHERNALIA MISCELLANEOUS
Qty: 40 EACH | Refills: 3 | OUTPATIENT
Start: 2022-08-09

## 2022-08-10 RX ORDER — INFUSION SET FOR INSULIN PUMP
INFUSION SETS-PARAPHERNALIA MISCELLANEOUS
Qty: 40 EACH | Refills: 10 | OUTPATIENT
Start: 2022-08-10

## 2022-08-10 RX ORDER — INSULIN PUMP CARTRIDGE
CARTRIDGE (EA) SUBCUTANEOUS
Refills: 10 | OUTPATIENT
Start: 2022-08-10

## 2022-11-08 ENCOUNTER — PATIENT MESSAGE (OUTPATIENT)
Dept: ENDOCRINOLOGY | Age: 47
End: 2022-11-08

## 2022-11-08 DIAGNOSIS — E03.9 PRIMARY HYPOTHYROIDISM: ICD-10-CM

## 2022-11-10 RX ORDER — LEVOTHYROXINE SODIUM 0.2 MG/1
200 TABLET ORAL
Qty: 90 TABLET | Refills: 3
Start: 2022-11-10 | End: 2022-11-15 | Stop reason: SDUPTHER

## 2022-11-10 RX ORDER — LEVOTHYROXINE SODIUM 0.03 MG/1
25 TABLET ORAL
Qty: 90 TABLET | Refills: 3
Start: 2022-11-10 | End: 2022-11-15 | Stop reason: SDUPTHER

## 2022-11-10 NOTE — TELEPHONE ENCOUNTER
Devin Sensor 11/10/2022 4:27 PM EST    Labs are in chart for review. ----- Message -----  From: Aime Maravilla  Sent: 11/8/2022 4:33 PM EST  To: [facility]  Subject: TSH Level     I am not sure if you are able to access Dr Leatha Beverly test results but my TSH is a 5.9. His nurse recommended asking you about increasing my Levothyroxine. Can you advise?

## 2022-11-15 RX ORDER — LEVOTHYROXINE SODIUM 0.03 MG/1
25 TABLET ORAL
Qty: 90 TABLET | Refills: 3 | Status: SHIPPED | OUTPATIENT
Start: 2022-11-15

## 2022-11-15 RX ORDER — LEVOTHYROXINE SODIUM 0.2 MG/1
200 TABLET ORAL
Qty: 90 TABLET | Refills: 3 | Status: SHIPPED | OUTPATIENT
Start: 2022-11-15

## 2023-01-10 ENCOUNTER — OFFICE VISIT (OUTPATIENT)
Dept: ENDOCRINOLOGY | Age: 48
End: 2023-01-10
Payer: COMMERCIAL

## 2023-01-10 VITALS
DIASTOLIC BLOOD PRESSURE: 64 MMHG | OXYGEN SATURATION: 98 % | SYSTOLIC BLOOD PRESSURE: 106 MMHG | HEART RATE: 74 BPM | BODY MASS INDEX: 39.68 KG/M2 | HEIGHT: 72 IN | WEIGHT: 293 LBS

## 2023-01-10 DIAGNOSIS — E03.9 PRIMARY HYPOTHYROIDISM: ICD-10-CM

## 2023-01-10 DIAGNOSIS — E78.2 MIXED HYPERLIPIDEMIA: ICD-10-CM

## 2023-01-10 DIAGNOSIS — Z96.41 INSULIN PUMP STATUS: ICD-10-CM

## 2023-01-10 DIAGNOSIS — E10.65 CONTROLLED TYPE 1 DIABETES MELLITUS WITH HYPERGLYCEMIA (HCC): Primary | ICD-10-CM

## 2023-01-10 DIAGNOSIS — I10 ESSENTIAL HYPERTENSION: ICD-10-CM

## 2023-01-10 PROCEDURE — 99214 OFFICE O/P EST MOD 30 MIN: CPT | Performed by: INTERNAL MEDICINE

## 2023-01-10 PROCEDURE — 3078F DIAST BP <80 MM HG: CPT | Performed by: INTERNAL MEDICINE

## 2023-01-10 PROCEDURE — 3074F SYST BP LT 130 MM HG: CPT | Performed by: INTERNAL MEDICINE

## 2023-01-10 PROCEDURE — 95251 CONT GLUC MNTR ANALYSIS I&R: CPT | Performed by: INTERNAL MEDICINE

## 2023-01-10 RX ORDER — SUBCUTANEOUS INSULIN PUMP
EACH MISCELLANEOUS
Qty: 1 EACH | Refills: 0
Start: 2023-01-10

## 2023-01-10 RX ORDER — BLOOD-GLUCOSE SENSOR
EACH MISCELLANEOUS
Qty: 3 EACH | Refills: 3
Start: 2023-01-10

## 2023-01-10 RX ORDER — ATORVASTATIN CALCIUM 40 MG/1
40 TABLET, FILM COATED ORAL NIGHTLY
Qty: 90 TABLET | Refills: 3
Start: 2023-01-10

## 2023-01-10 RX ORDER — ATORVASTATIN CALCIUM 40 MG/1
40 TABLET, FILM COATED ORAL DAILY
COMMUNITY
End: 2023-01-10 | Stop reason: SDUPTHER

## 2023-01-10 RX ORDER — INSULIN LISPRO 100 [IU]/ML
INJECTION, SOLUTION INTRAVENOUS; SUBCUTANEOUS
COMMUNITY
Start: 2022-11-10 | End: 2023-01-10

## 2023-01-10 RX ORDER — MELOXICAM 7.5 MG/1
TABLET ORAL
COMMUNITY
Start: 2022-11-11

## 2023-01-10 RX ORDER — LEVOTHYROXINE SODIUM 0.2 MG/1
200 TABLET ORAL
Qty: 90 TABLET | Refills: 3
Start: 2023-01-10

## 2023-01-10 RX ORDER — BLOOD-GLUCOSE TRANSMITTER
EACH MISCELLANEOUS
Qty: 1 EACH | Refills: 3
Start: 2023-01-10

## 2023-01-10 RX ORDER — INSULIN GLARGINE 100 [IU]/ML
80 INJECTION, SOLUTION SUBCUTANEOUS DAILY
Qty: 10 ML | Refills: 1
Start: 2023-01-10

## 2023-01-10 RX ORDER — IRBESARTAN 75 MG/1
75 TABLET ORAL DAILY
Qty: 90 TABLET | Refills: 3
Start: 2023-01-10

## 2023-01-10 RX ORDER — SEMAGLUTIDE 1.34 MG/ML
1 INJECTION, SOLUTION SUBCUTANEOUS
Qty: 9 ML | Refills: 3
Start: 2023-01-10

## 2023-01-10 RX ORDER — LEVOTHYROXINE SODIUM 0.03 MG/1
25 TABLET ORAL
Qty: 90 TABLET | Refills: 3
Start: 2023-01-10

## 2023-01-10 RX ORDER — DAPAGLIFLOZIN 5 MG/1
5 TABLET, FILM COATED ORAL DAILY
Qty: 90 TABLET | Refills: 3
Start: 2023-01-10

## 2023-01-10 ASSESSMENT — ENCOUNTER SYMPTOMS
CONSTIPATION: 0
DIARRHEA: 0

## 2023-01-10 NOTE — PROGRESS NOTES
Gary Mauro MD, [facility]  [address], [address] [facility], [address] [place] [address]  Phone [phone]  Facsimile [phone]          Anais Bowman is a 49 y.o. female seen 1/10/2023 for follow up of type 1 diabetes mellitus and hypothyroidism        ASSESSMENT AND PLAN:    1. Controlled type 1 diabetes mellitus with hyperglycemia ([address]  She has a long-standing history of type 1 diabetes mellitus complicated by diabetic peripheral neuropathy and retinopathy. Urine microalbumin negative 11/2022. Eye exam negative 11/2022 per her report. - LANTUS 100 UNIT/ML injection vial; Inject 80 Units into the skin daily For insulin pump failure  Dispense: 10 mL; Refill: 1  - Continuous Blood Gluc Sensor (DEXCOM G6 SENSOR) MISC; Change every 10 days  Dispense: 3 each; Refill: 3  - Continuous Blood Gluc Transmit (DEXCOM G6 TRANSMITTER) MISC; Change every 3 months  Dispense: 1 each; Refill: 3    2. Insulin pump status  Her overall glycemic control is pretty good and her most recent hemoglobin A1c was at goal less than 7. I adjusted her pump setting as below on her hyperglycemic patterns. - Insulin Infusion Pump (T:SLIM X2 INS PUMP/CONTROL-IQ) [place]; Basal rates: MN 4.3, 3a 4.5, 8a 3.1, 10a 3.1; 5p 3.2; ICR MN 3, 3a 3, 8a 3, 10a 2.5, 5p 1.5; ISF 10; Target 120; IOB 5 hr  Dispense: 1 each; Refill: 0  - HUMALOG 100 UNIT/ML injection vial; Up to 200 units per day per insulin pump  Dispense: 180 mL; Refill: 3  - FARXIGA 5 MG tablet; Take 1 tablet by mouth daily  Dispense: 90 tablet; Refill: 3  - OZEMPIC, 1 MG/DOSE, 4 MG/3ML SOPN; Inject 1 mg into the skin every 7 days  Dispense: 9 mL; Refill: 3    3. Primary hypothyroidism  I will assess her thyroid function. - levothyroxine (SYNTHROID) 200 MCG tablet; Take 1 tablet by mouth every morning (before breakfast) Total daily dose 225 mcg  Dispense: 90 tablet; Refill: 3  - levothyroxine (SYNTHROID) 25 MCG tablet;  Take 1 tablet by mouth every morning (before breakfast) Total daily dose 225 mcg  Dispense: 90 tablet; Refill: 3    4. Essential hypertension  Blood pressure at goal less than 130/80.    - irbesartan (AVAPRO) 75 MG tablet; Take 1 tablet by mouth daily  Dispense: 90 tablet; Refill: 3    5. Mixed hyperlipidemia  LDL not at goal less than 70 11/2022 and her primary care physician restarted her atorvastatin. - atorvastatin (LIPITOR) 40 MG tablet; Take 1 tablet by mouth at bedtime  Dispense: 90 tablet; Refill: 3                Procedures:     Interpretation of 72 hour glucose monitor: At least 72 hours of data were reviewed. The patient utilizes a Dexcom G6 continuous glucose monitoring system. The average glucose during the reviewed timeframe was 161 (time in range 72%, above target 26%, below target 100 2%). There is a pattern of mild hyperglycemia at all times of the day. Follow-up and Dispositions    Return in about 4 months (around 5/10/2023). HISTORY OF PRESENT ILLNESS:    DIABETES MELLITUS     Diagnosis: Type 1 diabetes mellitus diagnosed age 15 (10/30/2018: Glucose 251, C-peptide <0.1.  8/9/2019: Glucose 231, C-peptide <0.1). Diet: Carbohydrate counts. Exercise: No regular exercise. Monitoring: Accu-Chek Shannen and Dexcom G6 - 6-8 times per day. Blood Glucose: By review of Dexcom download: Average 161, range  (time in range 100-180 72%, above target >180 26%, below target <100 2%. Hypoglycemia: +Hypoglycemic awareness. She reports rare hypoglycemia. Hemoglobin A1c:  2/20/2017: 9.5.  4/27/2017: 9.4.  9/8/2017: 9.4.  2/21/2018: 8.6.  10/29/2018: 8.6.  3/11/2019: 8.0.  4/23/2019: 8.0.  8/9/2019: 8.2.  10/29/2019: 6.6.  5/8/2020: 7.7.  8/4/2020: 7.7.  6/4/2021: 7.3.  9/8/2021: 7.5.  2/2/2022: 7.2.  4/7/2022: 7.5.  7/8/2022: 6.5 (she is on iron replacement). 11/7/2022: 6.2.      Microalbumin/Nephropathy:  2/20/2017: Creatinine 0.72 (), urine microalbumin-POC 20.  4/27/2017: Creatinine 0.83 (GFR 87). 10/30/2018: Creatinine 1.00 (GFR 69), urine microalbumin/creatinine <2.9.  4/23/2019: Creatinine 0.87 (GFR 81), urine microalbumin-POC 20.  8/9/2019: Creatinine 0.74 (GFR 99), urine microalbumin/creatinine <6.8.  10/29/2019: Creatinine 0.95 (GFR 73). 5/8/2020: Creatinine 0.79 (GFR 91), urine microalbumin/creatinine <4.  8/4/2020: Creatinine 0.75 (GFR 97), urine microalbumin/creatinine <7.  6/4/2021: Creatinine 0.81 (GFR >60), urine microalbumin/creatinine 8.  8/6/2021: Creatinine 1.12 (GFR 59), urine microalbumin/creatinine-POC <30.    4/7/2022: Urine microalbumin/creatinine-POC <30.  7/1/2022: Creatinine 0.84 (GFR 86). 11/7/2022: Creatinine 0.88 (GFR 81), urine microalbumin-POC <30. Neuropathy: +Diabetic peripheral neuropathy with mild numbness of feet. She also has some shooting pains in her toes. Retinopathy: Eye exam 11/2022 (Dr. Steven Hardy at [facility]) - no retinopathy per patient report. Lipids: She states that her PCP stopped her atorvastatin 40 mg daily. 2/20/2017: Total cholesterol 146, triglycerides 110, HDL 61, LDL 63, VLDL 22.  4/27/2017: Total cholesterol 145, triglycerides 116, HDL 56, LDL 66, VLDL 23.  10/30/2018: Total cholesterol 147, triglycerides 151, HDL 49, LDL 68, VLDL 30.  8/9/2019: Total cholesterol 158, triglycerides 100, HDL 59, LDL 79, VLDL 20.  5/8/2020: Total cholesterol 157, triglycerides 109, HDL 60, LDL 75, VLDL 22.  8/4/2020: Total cholesterol 154, triglycerides 136, HDL 61, LDL 66, VLDL 27.  6/4/2021: Total cholesterol 152, triglycerides 69, HDL 73, LDL 65.2, VLDL 13.8.  8/6/2021: Total cholesterol 146, triglycerides 177, HDL 59, LDL 52, VLDL 35.  4/7/2022: Total cholesterol 143, triglycerides 84, HDL 56, LDL 70, VLDL 17.  11/7/2022: Total cholesterol 215, triglycerides 120, HDL 61, , VLDL 21 (off atorvastatin). Pump: Tandem t:slim with Control IQ started 12/2021. Pump issues: None. Pump settings:  Total daily basal dose: 80.39 units. Other treatment: Jose Alberto More. THYROID DISEASE     Presentation/Diagnosis: Hypothyroidism secondary to Hashimoto's thyroiditis diagnosed around 2010. Symptoms: See review of systems. Treatment: Takes generic in AM correctly. Imaging: None. Labs:  2/20/2017: TSH 5.780, total T4 8.1.  4/27/2017: TSH 5.460, total T4 11.3, T3 uptake 31, free thyroxine index 3.5.  9/8/2017: TSH 0.994, thyroid peroxidase antibodies 504.  10/30/2018: TSH 3.130.  4/23/2019: TSH 2.160.  8/9/2019: TSH 3.130.  10/29/2019: TSH 1.750.  5/8/2020: TSH 0.060, free T4 2.30.  8/4/2020: TSH 0.957, free T4 1.62.  6/4/2021: TSH 0.23, free T4 1.6, total T3 0.96.  8/6/2021: TSH 1.104.  4/7/2022: TSH 2.081, free T4 1.34.  11/7/2022: TSH 5.9, free T4 1.43. Pregnancy status: Status post hysterectomy. Review of Systems   Constitutional:  Positive for diaphoresis (chronic night sweats, improving) and fatigue (stable). Weight increased 2 pounds. Cardiovascular:  Negative for palpitations. Gastrointestinal:  Negative for constipation and diarrhea. Endocrine: Negative for cold intolerance. She reports some rare hot flashes. Neurological:  Negative for tremors. Vital Signs:  /64   Pulse 74   Ht 6' 1\" (1.854 m)   Wt (!) 310 lb (140.6 kg)   SpO2 98%   BMI 40.90 kg/m²     Wt Readings from Last 3 Encounters:   01/10/23 (!) 310 lb (140.6 kg)   07/08/22 (!) 308 lb (139.7 kg)   02/02/22 300 lb (136.1 kg)       Physical Exam  Constitutional:       General: She is not in acute distress. Neck:      Thyroid: No thyroid mass or thyromegaly. Cardiovascular:      Rate and Rhythm: Normal rate and regular rhythm. Comments: DP pulses 2+ bilaterally. No edema. Lymphadenopathy:      Cervical: No cervical adenopathy. Skin:     Comments: Calluses on bilateral 1st toes medially without ulcers. Neurological:      Motor: No tremor.       Comments: Monofilament decreased. Ankle reflexes absent. Orders Placed This Encounter   Procedures    TSH with Reflex     Standing Status:   Future     Standing Expiration Date:   1/10/2024    CO CONTINUOUS GLUCOSE MONITORING ANALYSIS I&R       Current Outpatient Medications   Medication Sig Dispense Refill    meloxicam (MOBIC) 7.5 MG tablet       LANTUS 100 UNIT/ML injection vial Inject 80 Units into the skin daily For insulin pump failure 10 mL 1    Continuous Blood Gluc Sensor (DEXCOM G6 SENSOR) MISC Change every 10 days 3 each 3    Continuous Blood Gluc Transmit (DEXCOM G6 TRANSMITTER) MISC Change every 3 months 1 each 3    Insulin Infusion Pump (T:SLIM X2 INS PUMP/CONTROL-IQ) [place] Basal rates: MN 4.3, 3a 4.5, 8a 3.1, 10a 3.1; 5p 3.2; ICR MN 3, 3a 3, 8a 3, 10a 2.5, 5p 1.5; ISF 10;  Target 120; IOB 5 hr 1 each 0    HUMALOG 100 UNIT/ML injection vial Up to 200 units per day per insulin pump 180 mL 3    FARXIGA 5 MG tablet Take 1 tablet by mouth daily 90 tablet 3    OZEMPIC, 1 MG/DOSE, 4 MG/3ML SOPN Inject 1 mg into the skin every 7 days 9 mL 3    levothyroxine (SYNTHROID) 200 MCG tablet Take 1 tablet by mouth every morning (before breakfast) Total daily dose 225 mcg 90 tablet 3    levothyroxine (SYNTHROID) 25 MCG tablet Take 1 tablet by mouth every morning (before breakfast) Total daily dose 225 mcg 90 tablet 3    irbesartan (AVAPRO) 75 MG tablet Take 1 tablet by mouth daily 90 tablet 3    atorvastatin (LIPITOR) 40 MG tablet Take 1 tablet by mouth at bedtime 90 tablet 3    coenzyme Q10 100 MG CAPS capsule Take by mouth      Fe Fum-FA-B Ssh-G-Hc-Mg-Mn-Cu (HEMOCYTE PLUS) 106-1 MG CAPS TAKE 1 CAPSULE BY MOUTH DAILY      Insulin Infusion Pump Supplies (AUTOSOFT 90 INFUSION SET) MISC       Insulin Infusion Pump Supplies (T:SLIM X2 3ML CARTRIDGE) MISC       MAGNESIUM-OXIDE 400 (240 Mg) MG tablet       potassium chloride (KLOR-CON M) 20 MEQ extended release tablet       Turmeric, Curcuma Longa, (CURCUMIN) POWD by Does not apply route Multiple Vitamins-Minerals (HAIR SKIN AND NAILS FORMULA PO) Take by mouth      Multiple Vitamin (MULTIVITAMIN PO) Take by mouth daily      aspirin 325 MG tablet Take 325 mg by mouth daily      vitamin D (CHOLECALCIFEROL) 250 MCG (04589 UT) CAPS capsule Take by mouth daily      cyanocobalamin 1000 MCG tablet Take 5,000 mcg by mouth daily      fexofenadine (ALLEGRA) 180 MG tablet Take 180 mg by mouth daily      lansoprazole (PREVACID) 30 MG delayed release capsule TAKE 1 CAPSULE BY MOUTH ONCE DAILY BEFORE BREAKFAST      montelukast (SINGULAIR) 10 MG tablet TAKE ONE TABLET BY MOUTH EVERY EVENING      traZODone (DESYREL) 50 MG tablet TAKE 1 TABLET BY MOUTH EVERY EVENING      azelastine (ASTELIN) 0.1 % nasal spray USE 2 SPRAYS IN EACH NOSTRIL TWICE DAILY (Patient not taking: Reported on 1/10/2023)      fluticasone (FLONASE) 50 MCG/ACT nasal spray SHAKE LIQUID AND USE 1 SPRAY IN EACH NOSTRIL TWICE DAILY AS NEEDED (Patient not taking: Reported on 1/10/2023)      fluticasone (CUTIVATE) 0.05 % cream APPLY TOPICALLY TO FACE TWICE DAILY AS NEEDED FOR FLAKING OR ITCHING (Patient not taking: Reported on 1/10/2023)       No current facility-administered medications for this visit.

## 2023-01-11 DIAGNOSIS — E03.9 PRIMARY HYPOTHYROIDISM: ICD-10-CM

## 2023-01-12 LAB — TSH W FREE THYROID IF ABNORMAL: 1.43 UIU/ML (ref 0.36–3.74)

## 2023-02-10 DIAGNOSIS — Z96.41 INSULIN PUMP STATUS: ICD-10-CM

## 2023-02-10 RX ORDER — SEMAGLUTIDE 1.34 MG/ML
1 INJECTION, SOLUTION SUBCUTANEOUS
Qty: 9 ML | Refills: 3 | Status: SHIPPED | OUTPATIENT
Start: 2023-02-10

## 2023-02-10 NOTE — TELEPHONE ENCOUNTER
[facility] is unable to retrieve the clinical questions that must be submitted to initiate the PA request. Please see more information at the bottom of the page for next steps. (Key: [id number])  Ozempic (1 MG/DOSE) 4MG/3ML pen-injectors    Drug is covered by current benefit plan. No further PA activity needed!

## 2023-03-07 ENCOUNTER — PATIENT MESSAGE (OUTPATIENT)
Dept: ENDOCRINOLOGY | Age: 48
End: 2023-03-07

## 2023-03-09 RX ORDER — INSULIN PUMP CARTRIDGE
CARTRIDGE (EA) SUBCUTANEOUS
Refills: 2 | OUTPATIENT
Start: 2023-03-09

## 2023-03-09 RX ORDER — INSULIN PUMP CARTRIDGE
CARTRIDGE (EA) SUBCUTANEOUS
Qty: 30 EACH | Refills: 3 | Status: SHIPPED | OUTPATIENT
Start: 2023-03-09

## 2023-03-09 RX ORDER — INFUSION SET FOR INSULIN PUMP
INFUSION SETS-PARAPHERNALIA MISCELLANEOUS
Qty: 40 EACH | Refills: 2 | OUTPATIENT
Start: 2023-03-09

## 2023-03-09 RX ORDER — INFUSION SET FOR INSULIN PUMP
INFUSION SETS-PARAPHERNALIA MISCELLANEOUS
Qty: 30 EACH | Refills: 3 | Status: SHIPPED | OUTPATIENT
Start: 2023-03-09

## 2023-03-09 NOTE — TELEPHONE ENCOUNTER
Patient sent a [employer] message stating she is almost out of pump supplies. [facility].

## 2023-03-13 RX ORDER — INSULIN PUMP CARTRIDGE
CARTRIDGE (EA) SUBCUTANEOUS
Refills: 3 | OUTPATIENT
Start: 2023-03-13

## 2023-03-13 RX ORDER — INFUSION SET FOR INSULIN PUMP
INFUSION SETS-PARAPHERNALIA MISCELLANEOUS
Qty: 30 EACH | Refills: 3 | OUTPATIENT
Start: 2023-03-13

## 2023-03-15 RX ORDER — INSULIN PUMP CARTRIDGE
CARTRIDGE (EA) SUBCUTANEOUS
Qty: 45 EACH | Refills: 3 | Status: SHIPPED | OUTPATIENT
Start: 2023-03-15

## 2023-03-15 RX ORDER — INFUSION SET FOR INSULIN PUMP
INFUSION SETS-PARAPHERNALIA MISCELLANEOUS
Qty: 45 EACH | Refills: 3 | Status: SHIPPED | OUTPATIENT
Start: 2023-03-15

## 2023-03-15 RX ORDER — INFUSION SET FOR INSULIN PUMP
INFUSION SETS-PARAPHERNALIA MISCELLANEOUS
Qty: 40 EACH | Refills: 3 | OUTPATIENT
Start: 2023-03-15

## 2023-03-15 RX ORDER — INSULIN PUMP CARTRIDGE
CARTRIDGE (EA) SUBCUTANEOUS
Refills: 3 | OUTPATIENT
Start: 2023-03-15

## 2023-03-15 NOTE — TELEPHONE ENCOUNTER
Patient needs a new prescription since the sig said change every 3 days, when she changes every 2  days.

## 2023-05-16 ENCOUNTER — PATIENT MESSAGE (OUTPATIENT)
Dept: ENDOCRINOLOGY | Age: 48
End: 2023-05-16

## 2023-05-16 DIAGNOSIS — Z96.41 INSULIN PUMP STATUS: ICD-10-CM

## 2023-05-16 RX ORDER — BLOOD SUGAR DIAGNOSTIC
STRIP MISCELLANEOUS
Qty: 200 EACH | Refills: 3 | Status: SHIPPED | OUTPATIENT
Start: 2023-05-16

## 2023-05-16 RX ORDER — DAPAGLIFLOZIN 5 MG/1
5 TABLET, FILM COATED ORAL DAILY
Qty: 90 TABLET | Refills: 1 | Status: SHIPPED | OUTPATIENT
Start: 2023-05-16

## 2023-05-16 NOTE — TELEPHONE ENCOUNTER
Pt is requesting a refill on pended medication. Pharmacy was verified.   Last OV: 1/10/23  Next OV:  5/26/23

## 2023-05-16 NOTE — TELEPHONE ENCOUNTER
From: Odin Boss  To: Dr. Susana Sales: 5/16/2023 8:03 AM EDT  Subject: Accu Chek Shannen test strips    I need a refill of my Accu Chek Shannen test strips, Faxiga and Insulin (18 vials for 3 mo). I only test as needed when my levels are off. So maybe 200 for 3 months.

## 2023-08-24 DIAGNOSIS — Z96.41 INSULIN PUMP STATUS: ICD-10-CM

## 2023-08-24 RX ORDER — SEMAGLUTIDE 1.34 MG/ML
INJECTION, SOLUTION SUBCUTANEOUS
Qty: 9 ML | Refills: 3 | OUTPATIENT
Start: 2023-08-24

## 2023-09-01 ENCOUNTER — TELEPHONE (OUTPATIENT)
Dept: ENDOCRINOLOGY | Age: 48
End: 2023-09-01

## 2023-09-01 NOTE — TELEPHONE ENCOUNTER
Elissa sent me a form for the patient to try and take generic insulin. I called the patient to see if that is what she wanted to do. The patient stated that she did not want to do that and to not change her medication.  I will shred the request. Alexander Mcclendon

## 2023-10-17 ENCOUNTER — OFFICE VISIT (OUTPATIENT)
Dept: ENDOCRINOLOGY | Age: 48
End: 2023-10-17
Payer: COMMERCIAL

## 2023-10-17 VITALS
SYSTOLIC BLOOD PRESSURE: 120 MMHG | HEART RATE: 73 BPM | OXYGEN SATURATION: 97 % | DIASTOLIC BLOOD PRESSURE: 62 MMHG | WEIGHT: 293 LBS | BODY MASS INDEX: 40.37 KG/M2

## 2023-10-17 DIAGNOSIS — E03.9 PRIMARY HYPOTHYROIDISM: ICD-10-CM

## 2023-10-17 DIAGNOSIS — E78.2 MIXED HYPERLIPIDEMIA: ICD-10-CM

## 2023-10-17 DIAGNOSIS — I10 ESSENTIAL HYPERTENSION: ICD-10-CM

## 2023-10-17 DIAGNOSIS — Z96.41 INSULIN PUMP STATUS: ICD-10-CM

## 2023-10-17 DIAGNOSIS — E10.65 CONTROLLED TYPE 1 DIABETES MELLITUS WITH HYPERGLYCEMIA (HCC): Primary | ICD-10-CM

## 2023-10-17 PROCEDURE — 3074F SYST BP LT 130 MM HG: CPT | Performed by: INTERNAL MEDICINE

## 2023-10-17 PROCEDURE — 3078F DIAST BP <80 MM HG: CPT | Performed by: INTERNAL MEDICINE

## 2023-10-17 PROCEDURE — 95251 CONT GLUC MNTR ANALYSIS I&R: CPT | Performed by: INTERNAL MEDICINE

## 2023-10-17 PROCEDURE — 99214 OFFICE O/P EST MOD 30 MIN: CPT | Performed by: INTERNAL MEDICINE

## 2023-10-17 RX ORDER — IRBESARTAN 75 MG/1
75 TABLET ORAL DAILY
Qty: 90 TABLET | Refills: 3 | Status: SHIPPED | OUTPATIENT
Start: 2023-10-17

## 2023-10-17 RX ORDER — PROCHLORPERAZINE 25 MG/1
SUPPOSITORY RECTAL
Qty: 1 EACH | Refills: 3
Start: 2023-10-17

## 2023-10-17 RX ORDER — INSULIN LISPRO 100 [IU]/ML
INJECTION, SOLUTION INTRAVENOUS; SUBCUTANEOUS
Qty: 180 ML | Refills: 3 | Status: SHIPPED | OUTPATIENT
Start: 2023-10-17

## 2023-10-17 RX ORDER — PROCHLORPERAZINE 25 MG/1
SUPPOSITORY RECTAL
Qty: 3 EACH | Refills: 3
Start: 2023-10-17

## 2023-10-17 RX ORDER — DAPAGLIFLOZIN 5 MG/1
5 TABLET, FILM COATED ORAL DAILY
Qty: 90 TABLET | Refills: 3 | Status: SHIPPED | OUTPATIENT
Start: 2023-10-17

## 2023-10-17 RX ORDER — ATORVASTATIN CALCIUM 40 MG/1
40 TABLET, FILM COATED ORAL NIGHTLY
Qty: 90 TABLET | Refills: 3 | Status: SHIPPED | OUTPATIENT
Start: 2023-10-17

## 2023-10-17 RX ORDER — SUBCUTANEOUS INSULIN PUMP
EACH MISCELLANEOUS
Qty: 1 EACH | Refills: 0
Start: 2023-10-17

## 2023-10-17 RX ORDER — LEVOTHYROXINE SODIUM 0.03 MG/1
25 TABLET ORAL
Qty: 90 TABLET | Refills: 3 | Status: SHIPPED | OUTPATIENT
Start: 2023-10-17

## 2023-10-17 RX ORDER — INSULIN GLARGINE 100 [IU]/ML
80 INJECTION, SOLUTION SUBCUTANEOUS DAILY
Qty: 10 ML | Refills: 1
Start: 2023-10-17

## 2023-10-17 RX ORDER — LEVOTHYROXINE SODIUM 0.2 MG/1
200 TABLET ORAL
Qty: 90 TABLET | Refills: 3 | Status: SHIPPED | OUTPATIENT
Start: 2023-10-17

## 2023-10-17 RX ORDER — SEMAGLUTIDE 1.34 MG/ML
1 INJECTION, SOLUTION SUBCUTANEOUS
Qty: 9 ML | Refills: 3 | Status: SHIPPED | OUTPATIENT
Start: 2023-10-17

## 2023-10-17 ASSESSMENT — ENCOUNTER SYMPTOMS
DIARRHEA: 0
CONSTIPATION: 0

## 2023-10-17 NOTE — PROGRESS NOTES
every 2 days  E10.65 45 each 3    Insulin Infusion Pump Supplies (T:SLIM X2 3ML CARTRIDGE) MISC Change every 2 days  E10.65 45 each 3    meloxicam (MOBIC) 7.5 MG tablet       azelastine (ASTELIN) 0.1 % nasal spray       coenzyme Q10 100 MG CAPS capsule Take by mouth      Fe Fum-FA-B Yvw-V-Iy-Mg-Mn-Cu (HEMOCYTE PLUS) 106-1 MG CAPS TAKE 1 CAPSULE BY MOUTH DAILY      fluticasone (FLONASE) 50 MCG/ACT nasal spray       fluticasone (CUTIVATE) 0.05 % cream       MAGNESIUM-OXIDE 400 (240 Mg) MG tablet       potassium chloride (KLOR-CON M) 20 MEQ extended release tablet       Turmeric, Curcuma Longa, (CURCUMIN) POWD by Does not apply route      Multiple Vitamins-Minerals (HAIR SKIN AND NAILS FORMULA PO) Take by mouth      Multiple Vitamin (MULTIVITAMIN PO) Take by mouth daily      aspirin 325 MG tablet Take 1 tablet by mouth daily      vitamin D (CHOLECALCIFEROL) 250 MCG (00735 UT) CAPS capsule Take by mouth daily      cyanocobalamin 1000 MCG tablet Take 5 tablets by mouth daily      fexofenadine (ALLEGRA) 180 MG tablet Take 1 tablet by mouth daily      lansoprazole (PREVACID) 30 MG delayed release capsule TAKE 1 CAPSULE BY MOUTH ONCE DAILY BEFORE BREAKFAST      montelukast (SINGULAIR) 10 MG tablet TAKE ONE TABLET BY MOUTH EVERY EVENING      traZODone (DESYREL) 50 MG tablet TAKE 1 TABLET BY MOUTH EVERY EVENING       No current facility-administered medications for this visit.

## 2023-12-14 ENCOUNTER — TELEPHONE (OUTPATIENT)
Dept: ENDOCRINOLOGY | Age: 48
End: 2023-12-14

## 2024-01-19 DIAGNOSIS — E10.65 CONTROLLED TYPE 1 DIABETES MELLITUS WITH HYPERGLYCEMIA (HCC): Primary | ICD-10-CM

## 2024-01-19 RX ORDER — INFUSION SET FOR INSULIN PUMP
INFUSION SETS-PARAPHERNALIA MISCELLANEOUS
Qty: 45 EACH | Refills: 2 | Status: SHIPPED | OUTPATIENT
Start: 2024-01-19

## 2024-01-19 RX ORDER — INSULIN PUMP CARTRIDGE
CARTRIDGE (EA) SUBCUTANEOUS
Qty: 45 EACH | Refills: 2 | Status: SHIPPED | OUTPATIENT
Start: 2024-01-19

## 2024-01-19 NOTE — TELEPHONE ENCOUNTER
Patients pharmacy called stating that the patient called them saying she is completely out of her infusion sets and cartridges. She just needs enough to last till her appointment with  on 4/10/2024. Since  is not here I am sending this to you.

## 2024-01-22 RX ORDER — INFUSION SET FOR INSULIN PUMP
INFUSION SETS-PARAPHERNALIA MISCELLANEOUS
Qty: 40 EACH | Refills: 4 | OUTPATIENT
Start: 2024-01-22

## 2024-01-22 RX ORDER — INSULIN PUMP CARTRIDGE
CARTRIDGE (EA) SUBCUTANEOUS
Qty: 40 EACH | Refills: 4 | OUTPATIENT
Start: 2024-01-22

## 2024-01-24 ENCOUNTER — PROCEDURE VISIT (OUTPATIENT)
Dept: NEUROLOGY | Age: 49
End: 2024-01-24
Payer: COMMERCIAL

## 2024-01-24 VITALS — WEIGHT: 293 LBS | OXYGEN SATURATION: 97 % | BODY MASS INDEX: 39.68 KG/M2 | HEIGHT: 72 IN | HEART RATE: 71 BPM

## 2024-01-24 DIAGNOSIS — G56.03 BILATERAL CARPAL TUNNEL SYNDROME: Primary | ICD-10-CM

## 2024-01-24 PROCEDURE — 95885 MUSC TST DONE W/NERV TST LIM: CPT | Performed by: PSYCHIATRY & NEUROLOGY

## 2024-01-24 PROCEDURE — 95913 NRV CNDJ TEST 13/> STUDIES: CPT | Performed by: PSYCHIATRY & NEUROLOGY

## 2024-01-24 NOTE — PROGRESS NOTES
ABNORMALITIES OF VERY SEVERE RIGHT CARPAL TUNNEL SYNDROME WITH ASSOCIATED AXONAL CHANGES AT THE THENAR MUSCLES.  THE LEFT MEDIAN ENTRAPMENT IS ALSO MODERATELY SEVERE.  THERE ARE NO OTHER NEUROPATHIES.           CONCLUSION:      Compatible with very severe right and severe left carpal tunnel syndromes.      Procedure Details:       Correlates with bilateral carpal tunnel syndromes = severe right and moderately severe left.    Patient made fully aware of the above.               Please Note::     Data and waveforms * filed under Procedure category [facility].    See Procedure Files for complete data pages.       [ *NOTE:  parts or all of this consultation are produced using artificial voice recognition software.  Some speech errors are inherent in such software and may be included in the produced record. ]           Bakari Dao MD  [facility]  [place] [address]  Phone:  [phone]  Fax:   [phone]          + + +   Glossary:   MUP: motor unit potential;  SNAP: sensory nerve action potential; Fibs:  Fibrillations;  Fascic: fasciculations; IA: insertional activity;  IP: interference pattern;  SCV :  Sensory conduction velocity;  MCV: motor conduction velocity; NOTE:: muscles are abbreviated latin initials.     [place] raw datafile::   * filed at Procedure or Media Files. *

## 2024-03-10 DIAGNOSIS — E03.9 PRIMARY HYPOTHYROIDISM: ICD-10-CM

## 2024-03-11 RX ORDER — LEVOTHYROXINE SODIUM 0.2 MG/1
200 TABLET ORAL
Qty: 90 TABLET | Refills: 3 | OUTPATIENT
Start: 2024-03-11

## 2024-04-09 ENCOUNTER — TELEPHONE (OUTPATIENT)
Dept: ENDOCRINOLOGY | Age: 49
End: 2024-04-09

## 2024-04-10 ENCOUNTER — OFFICE VISIT (OUTPATIENT)
Dept: ENDOCRINOLOGY | Age: 49
End: 2024-04-10
Payer: COMMERCIAL

## 2024-04-10 VITALS
SYSTOLIC BLOOD PRESSURE: 118 MMHG | HEART RATE: 76 BPM | OXYGEN SATURATION: 97 % | BODY MASS INDEX: 39.18 KG/M2 | WEIGHT: 293 LBS | DIASTOLIC BLOOD PRESSURE: 80 MMHG

## 2024-04-10 DIAGNOSIS — Z96.41 INSULIN PUMP STATUS: ICD-10-CM

## 2024-04-10 DIAGNOSIS — E03.9 PRIMARY HYPOTHYROIDISM: ICD-10-CM

## 2024-04-10 DIAGNOSIS — E78.2 MIXED HYPERLIPIDEMIA: ICD-10-CM

## 2024-04-10 DIAGNOSIS — I10 ESSENTIAL HYPERTENSION: ICD-10-CM

## 2024-04-10 DIAGNOSIS — E10.65 CONTROLLED TYPE 1 DIABETES MELLITUS WITH HYPERGLYCEMIA (HCC): Primary | ICD-10-CM

## 2024-04-10 LAB
T4 FREE SERPL-MCNC: 1.5 NG/DL (ref 0.78–1.46)
TSH, 3RD GENERATION: 3.49 UIU/ML (ref 0.36–3.74)

## 2024-04-10 PROCEDURE — 3074F SYST BP LT 130 MM HG: CPT | Performed by: INTERNAL MEDICINE

## 2024-04-10 PROCEDURE — 3079F DIAST BP 80-89 MM HG: CPT | Performed by: INTERNAL MEDICINE

## 2024-04-10 PROCEDURE — 95251 CONT GLUC MNTR ANALYSIS I&R: CPT | Performed by: INTERNAL MEDICINE

## 2024-04-10 PROCEDURE — 99214 OFFICE O/P EST MOD 30 MIN: CPT | Performed by: INTERNAL MEDICINE

## 2024-04-10 RX ORDER — PROCHLORPERAZINE 25 MG/1
SUPPOSITORY RECTAL
Qty: 1 EACH | Refills: 3 | Status: SHIPPED | OUTPATIENT
Start: 2024-04-10

## 2024-04-10 RX ORDER — INSULIN GLARGINE 100 [IU]/ML
80 INJECTION, SOLUTION SUBCUTANEOUS DAILY
Qty: 10 ML | Refills: 1
Start: 2024-04-10

## 2024-04-10 RX ORDER — LEVOTHYROXINE SODIUM 0.03 MG/1
25 TABLET ORAL
Qty: 90 TABLET | Refills: 3 | Status: SHIPPED | OUTPATIENT
Start: 2024-04-10

## 2024-04-10 RX ORDER — ATORVASTATIN CALCIUM 40 MG/1
40 TABLET, FILM COATED ORAL NIGHTLY
Qty: 90 TABLET | Refills: 3
Start: 2024-04-10

## 2024-04-10 RX ORDER — PROCHLORPERAZINE 25 MG/1
SUPPOSITORY RECTAL
Qty: 3 EACH | Refills: 3 | Status: SHIPPED | OUTPATIENT
Start: 2024-04-10

## 2024-04-10 RX ORDER — SEMAGLUTIDE 1.34 MG/ML
1 INJECTION, SOLUTION SUBCUTANEOUS
Qty: 9 ML | Refills: 3 | Status: SHIPPED | OUTPATIENT
Start: 2024-04-10

## 2024-04-10 RX ORDER — LEVOTHYROXINE SODIUM 0.2 MG/1
200 TABLET ORAL
Qty: 90 TABLET | Refills: 3 | Status: SHIPPED | OUTPATIENT
Start: 2024-04-10

## 2024-04-10 RX ORDER — IRBESARTAN 75 MG/1
75 TABLET ORAL DAILY
Qty: 90 TABLET | Refills: 3
Start: 2024-04-10

## 2024-04-10 RX ORDER — INSULIN LISPRO 100 [IU]/ML
INJECTION, SOLUTION INTRAVENOUS; SUBCUTANEOUS
Qty: 180 ML | Refills: 3 | Status: SHIPPED | OUTPATIENT
Start: 2024-04-10

## 2024-04-10 RX ORDER — SUBCUTANEOUS INSULIN PUMP
EACH MISCELLANEOUS
Qty: 1 EACH | Refills: 0
Start: 2024-04-10

## 2024-04-10 RX ORDER — DAPAGLIFLOZIN 5 MG/1
5 TABLET, FILM COATED ORAL DAILY
Qty: 90 TABLET | Refills: 3 | Status: SHIPPED | OUTPATIENT
Start: 2024-04-10

## 2024-04-10 ASSESSMENT — ENCOUNTER SYMPTOMS
CONSTIPATION: 0
DIARRHEA: 0

## 2024-04-10 NOTE — PROGRESS NOTES
Gary Tran MD, FACE  [facility]ology [facility]  [address] [place] [address], [facility] [address]  [place] [address]  Phone [phone]  Facsimile [phone]          Olga Pichardo is a 48 y.o. female seen 4/10/2024 for follow up of type 1 diabetes mellitus and hypothyroidism        ASSESSMENT AND PLAN:    1. Controlled type 1 diabetes mellitus with hyperglycemia (HCC)  She has a long-standing history of type 1 diabetes mellitus complicated by diabetic peripheral neuropathy and retinopathy.  Urine microalbumin negative 10/2023.  Eye exam negative 3/2024 per her report.    - LANTUS 100 UNIT/ML injection vial; Inject 80 Units into the skin daily For insulin pump failure  Dispense: 10 mL; Refill: 1  - Continuous Blood Gluc Sensor (DEXCOM G6 SENSOR) MISC; Change every 10 days  Dispense: 3 each; Refill: 3  - Continuous Blood Gluc Transmit (DEXCOM G6 TRANSMITTER) MISC; Change every 3 months  Dispense: 1 each; Refill: 3    2. Insulin pump status  Her overall glycemic control is pretty good and her hemoglobin A1c is at goal less than 7.  I adjusted her insulin to carbohydrate ratios to target her postprandial hyperglycemia.    - Insulin Infusion Pump (T:SLIM X2 INS PUMP/CONTROL-IQ) [place]; Basal rates: MN 4.3, 3a 4.5, 8a 3.1, 10a 3.1; 5p 3.2; ICR MN 3, 3a 3, 8a 3, 10a 2, 5p 1.2; ISF 10; Target 120; IOB 5 hr  Dispense: 1 each; Refill: 0  - HUMALOG 100 UNIT/ML SOLN injection vial; Up to 200 units per day per insulin pump  Dispense: 180 mL; Refill: 3  - FARXIGA 5 MG tablet; Take 1 tablet by mouth daily  Dispense: 90 tablet; Refill: 3  - OZEMPIC, 1 MG/DOSE, 4 MG/3ML SOPN; Inject 1 mg into the skin every 7 days  Dispense: 9 mL; Refill: 3    3. Primary hypothyroidism  Her TSH is high normal and her free T4 is elevated.  Obviously the TSH should be suppressed in the setting.  I will repeat her thyroid function tests today.    - levothyroxine (SYNTHROID) 200 MCG tablet; Take 1 tablet by mouth every morning

## 2024-06-11 DIAGNOSIS — E10.65 CONTROLLED TYPE 1 DIABETES MELLITUS WITH HYPERGLYCEMIA (HCC): ICD-10-CM

## 2024-06-12 RX ORDER — INSULIN PUMP CARTRIDGE
CARTRIDGE (EA) SUBCUTANEOUS
Qty: 45 EACH | Refills: 2 | OUTPATIENT
Start: 2024-06-12

## 2024-06-14 ENCOUNTER — PATIENT MESSAGE (OUTPATIENT)
Dept: ENDOCRINOLOGY | Age: 49
End: 2024-06-14

## 2024-06-14 DIAGNOSIS — E10.65 CONTROLLED TYPE 1 DIABETES MELLITUS WITH HYPERGLYCEMIA (HCC): ICD-10-CM

## 2024-06-17 RX ORDER — INSULIN PUMP CARTRIDGE
CARTRIDGE (EA) SUBCUTANEOUS
Qty: 45 EACH | Refills: 2 | Status: SHIPPED | OUTPATIENT
Start: 2024-06-17

## 2024-06-17 RX ORDER — INFUSION SET FOR INSULIN PUMP
INFUSION SETS-PARAPHERNALIA MISCELLANEOUS
Qty: 45 EACH | Refills: 2 | Status: SHIPPED | OUTPATIENT
Start: 2024-06-17

## 2024-06-17 NOTE — TELEPHONE ENCOUNTER
From: Olga Pichardo  To: Dr. Gary Tran  Sent: 6/14/2024 4:16 PM EDT  Subject: Pump Supplies    [facility] Is saying that they need another prescription sent over for my pump supplies. They said they have sent over paperwork to you guys but haven't heard back. Is there someone who can check on this? So that I don't miss any of my prescriptions getting filled.

## 2024-09-04 ENCOUNTER — OFFICE VISIT (OUTPATIENT)
Dept: ENDOCRINOLOGY | Age: 49
End: 2024-09-04
Payer: COMMERCIAL

## 2024-09-04 VITALS
HEART RATE: 68 BPM | DIASTOLIC BLOOD PRESSURE: 74 MMHG | RESPIRATION RATE: 18 BRPM | SYSTOLIC BLOOD PRESSURE: 122 MMHG | BODY MASS INDEX: 39.68 KG/M2 | WEIGHT: 293 LBS | OXYGEN SATURATION: 98 % | HEIGHT: 72 IN

## 2024-09-04 DIAGNOSIS — E03.9 PRIMARY HYPOTHYROIDISM: ICD-10-CM

## 2024-09-04 DIAGNOSIS — I10 ESSENTIAL HYPERTENSION: ICD-10-CM

## 2024-09-04 DIAGNOSIS — Z96.41 INSULIN PUMP STATUS: ICD-10-CM

## 2024-09-04 DIAGNOSIS — E10.65 CONTROLLED TYPE 1 DIABETES MELLITUS WITH HYPERGLYCEMIA (HCC): Primary | ICD-10-CM

## 2024-09-04 DIAGNOSIS — E78.2 MIXED HYPERLIPIDEMIA: ICD-10-CM

## 2024-09-04 PROCEDURE — 99214 OFFICE O/P EST MOD 30 MIN: CPT | Performed by: INTERNAL MEDICINE

## 2024-09-04 PROCEDURE — 3074F SYST BP LT 130 MM HG: CPT | Performed by: INTERNAL MEDICINE

## 2024-09-04 PROCEDURE — 95251 CONT GLUC MNTR ANALYSIS I&R: CPT | Performed by: INTERNAL MEDICINE

## 2024-09-04 PROCEDURE — 3078F DIAST BP <80 MM HG: CPT | Performed by: INTERNAL MEDICINE

## 2024-09-04 RX ORDER — ACYCLOVIR 400 MG/1
TABLET ORAL
Qty: 9 EACH | Refills: 3 | Status: SHIPPED | OUTPATIENT
Start: 2024-09-04

## 2024-09-04 RX ORDER — DAPAGLIFLOZIN 5 MG/1
5 TABLET, FILM COATED ORAL DAILY
Qty: 90 TABLET | Refills: 3 | Status: SHIPPED | OUTPATIENT
Start: 2024-09-04

## 2024-09-04 RX ORDER — INFUSION SET FOR INSULIN PUMP
INFUSION SETS-PARAPHERNALIA MISCELLANEOUS
Qty: 45 EACH | Refills: 3 | Status: SHIPPED | OUTPATIENT
Start: 2024-09-04

## 2024-09-04 RX ORDER — SEMAGLUTIDE 2.68 MG/ML
2 INJECTION, SOLUTION SUBCUTANEOUS
Qty: 9 ML | Refills: 3 | Status: SHIPPED | OUTPATIENT
Start: 2024-09-04

## 2024-09-04 RX ORDER — SUBCUTANEOUS INSULIN PUMP
EACH MISCELLANEOUS
Qty: 1 EACH | Refills: 0
Start: 2024-09-04

## 2024-09-04 RX ORDER — LEVOTHYROXINE SODIUM 200 UG/1
200 TABLET ORAL
Qty: 90 TABLET | Refills: 3 | Status: SHIPPED | OUTPATIENT
Start: 2024-09-04

## 2024-09-04 RX ORDER — VENLAFAXINE 75 MG/1
75 TABLET ORAL DAILY
COMMUNITY

## 2024-09-04 RX ORDER — IRBESARTAN 75 MG/1
75 TABLET ORAL DAILY
Qty: 90 TABLET | Refills: 3 | Status: SHIPPED | OUTPATIENT
Start: 2024-09-04

## 2024-09-04 RX ORDER — ATORVASTATIN CALCIUM 40 MG/1
40 TABLET, FILM COATED ORAL NIGHTLY
Qty: 90 TABLET | Refills: 3 | Status: SHIPPED | OUTPATIENT
Start: 2024-09-04

## 2024-09-04 RX ORDER — INSULIN LISPRO 100 [IU]/ML
INJECTION, SOLUTION INTRAVENOUS; SUBCUTANEOUS
Qty: 180 ML | Refills: 3 | Status: SHIPPED | OUTPATIENT
Start: 2024-09-04

## 2024-09-04 RX ORDER — INSULIN GLARGINE 100 [IU]/ML
80 INJECTION, SOLUTION SUBCUTANEOUS DAILY
Qty: 10 ML | Refills: 1
Start: 2024-09-04

## 2024-09-04 RX ORDER — INSULIN PUMP CARTRIDGE
CARTRIDGE (EA) SUBCUTANEOUS
Qty: 45 EACH | Refills: 3 | Status: SHIPPED | OUTPATIENT
Start: 2024-09-04

## 2024-09-04 RX ORDER — LEVOTHYROXINE SODIUM 25 UG/1
25 TABLET ORAL
Qty: 90 TABLET | Refills: 3 | Status: SHIPPED | OUTPATIENT
Start: 2024-09-04

## 2024-09-04 ASSESSMENT — ENCOUNTER SYMPTOMS
DIARRHEA: 0
CONSTIPATION: 0

## 2024-09-04 NOTE — PROGRESS NOTES
Readings from Last 3 Encounters:   09/04/24 (!) 137.9 kg (304 lb)   04/10/24 134.7 kg (297 lb)   04/17/24 134.7 kg (297 lb)       Physical Exam  Constitutional:       General: She is not in acute distress.  Neck:      Thyroid: No thyroid mass or thyromegaly.   Cardiovascular:      Rate and Rhythm: Normal rate and regular rhythm.      Comments: DP pulses 2+ bilaterally.  No edema.  Lymphadenopathy:      Cervical: No cervical adenopathy.   Skin:     Comments: Calluses on bilateral 1st toes medially without ulcers.      Neurological:      Motor: No tremor.      Comments: Monofilament decreased.  Ankle reflexes absent.         Orders Placed This Encounter   Procedures    GLUCOSE MONITOR, 72 HOUR, PHYS INTERP    AMB POC HEMOGLOBIN A1C       Current Outpatient Medications   Medication Sig Dispense Refill    venlafaxine (EFFEXOR) 75 MG tablet Take 1 tablet by mouth daily      Calcium Carb-Cholecalciferol (CALCIUM 600 + D PO) Take by mouth      LANTUS 100 UNIT/ML injection vial Inject 80 Units into the skin daily For insulin pump failure 10 mL 1    Continuous Glucose Sensor (DEXCOM G7 SENSOR) MISC Change every 10 days 9 each 3    Insulin Infusion Pump (T:SLIM X2 INS PUMP/CONTROL-IQ) [place] Basal rates: MN 4.3, 3a 4.5, 8a 3.1, 10a 3.1; 5p 3.2; ICR MN 3, 3a 3, 8a 3, 10a 2, 5p 1.2; ISF 10; Target 120; IOB 5 hr 1 each 0    HUMALOG 100 UNIT/ML SOLN injection vial Up to 200 units per day per insulin pump 180 mL 3    FARXIGA 5 MG tablet Take 1 tablet by mouth daily 90 tablet 3    irbesartan (AVAPRO) 75 MG tablet Take 1 tablet by mouth daily 90 tablet 3    atorvastatin (LIPITOR) 40 MG tablet Take 1 tablet by mouth at bedtime 90 tablet 3    levothyroxine (SYNTHROID) 200 MCG tablet Take 1 tablet by mouth every morning (before breakfast) Total daily dose 225 mcg 90 tablet 3    levothyroxine (SYNTHROID) 25 MCG tablet Take 1 tablet by mouth every morning (before breakfast) Total daily dose 225 mcg 90 tablet 3    OZEMPIC, 2 MG/DOSE, 8

## 2024-09-19 ENCOUNTER — OFFICE VISIT (OUTPATIENT)
Dept: SLEEP MEDICINE | Age: 49
End: 2024-09-19
Payer: COMMERCIAL

## 2024-09-19 VITALS
DIASTOLIC BLOOD PRESSURE: 67 MMHG | SYSTOLIC BLOOD PRESSURE: 104 MMHG | RESPIRATION RATE: 17 BRPM | WEIGHT: 293 LBS | HEIGHT: 72 IN | BODY MASS INDEX: 39.68 KG/M2 | HEART RATE: 75 BPM | OXYGEN SATURATION: 93 %

## 2024-09-19 DIAGNOSIS — G47.33 OSA (OBSTRUCTIVE SLEEP APNEA): Primary | ICD-10-CM

## 2024-09-19 DIAGNOSIS — E66.01 MORBID OBESITY WITH BMI OF 40.0-44.9, ADULT: ICD-10-CM

## 2024-09-19 DIAGNOSIS — G47.00 INSOMNIA, UNSPECIFIED TYPE: ICD-10-CM

## 2024-09-19 PROCEDURE — 3078F DIAST BP <80 MM HG: CPT | Performed by: INTERNAL MEDICINE

## 2024-09-19 PROCEDURE — 99204 OFFICE O/P NEW MOD 45 MIN: CPT | Performed by: INTERNAL MEDICINE

## 2024-09-19 PROCEDURE — 3074F SYST BP LT 130 MM HG: CPT | Performed by: INTERNAL MEDICINE

## 2024-09-19 PROCEDURE — G2211 COMPLEX E/M VISIT ADD ON: HCPCS | Performed by: INTERNAL MEDICINE

## 2024-09-19 RX ORDER — ESZOPICLONE 2 MG/1
2 TABLET, FILM COATED ORAL NIGHTLY
Qty: 30 TABLET | Refills: 4 | Status: SHIPPED | OUTPATIENT
Start: 2024-09-19 | End: 2025-09-19

## 2024-09-19 ASSESSMENT — SLEEP AND FATIGUE QUESTIONNAIRES
HOW LIKELY ARE YOU TO NOD OFF OR FALL ASLEEP WHILE WATCHING TV: SLIGHT CHANCE OF DOZING
HOW LIKELY ARE YOU TO NOD OFF OR FALL ASLEEP IN A CAR, WHILE STOPPED FOR A FEW MINUTES IN TRAFFIC: WOULD NEVER DOZE
HOW LIKELY ARE YOU TO NOD OFF OR FALL ASLEEP WHILE LYING DOWN TO REST IN THE AFTERNOON WHEN CIRCUMSTANCES PERMIT: SLIGHT CHANCE OF DOZING
HOW LIKELY ARE YOU TO NOD OFF OR FALL ASLEEP WHEN YOU ARE A PASSENGER IN A CAR FOR AN HOUR WITHOUT A BREAK: SLIGHT CHANCE OF DOZING
HOW LIKELY ARE YOU TO NOD OFF OR FALL ASLEEP WHILE SITTING AND TALKING TO SOMEONE: WOULD NEVER DOZE
HOW LIKELY ARE YOU TO NOD OFF OR FALL ASLEEP WHILE SITTING AND READING: WOULD NEVER DOZE
ESS TOTAL SCORE: 3
HOW LIKELY ARE YOU TO NOD OFF OR FALL ASLEEP WHILE SITTING QUIETLY AFTER LUNCH WITHOUT ALCOHOL: WOULD NEVER DOZE
HOW LIKELY ARE YOU TO NOD OFF OR FALL ASLEEP WHILE SITTING INACTIVE IN A PUBLIC PLACE: WOULD NEVER DOZE

## 2024-10-17 ENCOUNTER — TELEPHONE (OUTPATIENT)
Dept: ENDOCRINOLOGY | Age: 49
End: 2024-10-17

## 2024-10-18 ENCOUNTER — TELEPHONE (OUTPATIENT)
Dept: ENDOCRINOLOGY | Age: 49
End: 2024-10-18

## 2024-11-11 ENCOUNTER — TELEPHONE (OUTPATIENT)
Dept: ENDOCRINOLOGY | Age: 49
End: 2024-11-11

## 2024-12-10 ENCOUNTER — TELEPHONE (OUTPATIENT)
Dept: ENDOCRINOLOGY | Age: 49
End: 2024-12-10

## 2024-12-11 ENCOUNTER — TELEPHONE (OUTPATIENT)
Dept: ENDOCRINOLOGY | Age: 49
End: 2024-12-11

## 2024-12-11 PROBLEM — E10.65 CONTROLLED TYPE 1 DIABETES MELLITUS WITH HYPERGLYCEMIA (HCC): Chronic | Status: ACTIVE | Noted: 2019-12-23

## 2024-12-11 NOTE — TELEPHONE ENCOUNTER
PA and appeals have been denied, letter in chart.  Message to pt to call to do final appeal allowed by pt only per ES.  Physician notified

## 2025-02-06 DIAGNOSIS — E10.65 CONTROLLED TYPE 1 DIABETES MELLITUS WITH HYPERGLYCEMIA (HCC): ICD-10-CM

## 2025-02-10 ENCOUNTER — OFFICE VISIT (OUTPATIENT)
Dept: ENDOCRINOLOGY | Age: 50
End: 2025-02-10
Payer: COMMERCIAL

## 2025-02-10 VITALS
HEART RATE: 73 BPM | HEIGHT: 72 IN | WEIGHT: 293 LBS | SYSTOLIC BLOOD PRESSURE: 121 MMHG | DIASTOLIC BLOOD PRESSURE: 78 MMHG | BODY MASS INDEX: 39.68 KG/M2 | OXYGEN SATURATION: 98 %

## 2025-02-10 DIAGNOSIS — E78.2 MIXED HYPERLIPIDEMIA: ICD-10-CM

## 2025-02-10 DIAGNOSIS — Z96.41 INSULIN PUMP STATUS: ICD-10-CM

## 2025-02-10 DIAGNOSIS — I10 ESSENTIAL HYPERTENSION: ICD-10-CM

## 2025-02-10 DIAGNOSIS — E03.9 PRIMARY HYPOTHYROIDISM: ICD-10-CM

## 2025-02-10 DIAGNOSIS — E10.65 UNCONTROLLED TYPE 1 DIABETES MELLITUS WITH HYPERGLYCEMIA (HCC): Primary | ICD-10-CM

## 2025-02-10 DIAGNOSIS — E10.65 UNCONTROLLED TYPE 1 DIABETES MELLITUS WITH HYPERGLYCEMIA (HCC): ICD-10-CM

## 2025-02-10 LAB
ALBUMIN SERPL-MCNC: 3.6 G/DL (ref 3.5–5)
ALBUMIN/GLOB SERPL: 1.2 (ref 1–1.9)
ALP SERPL-CCNC: 106 U/L (ref 35–104)
ALT SERPL-CCNC: 32 U/L (ref 8–45)
ANION GAP SERPL CALC-SCNC: 9 MMOL/L (ref 7–16)
AST SERPL-CCNC: 28 U/L (ref 15–37)
BILIRUB SERPL-MCNC: 0.4 MG/DL (ref 0–1.2)
BUN SERPL-MCNC: 17 MG/DL (ref 6–23)
CALCIUM SERPL-MCNC: 9.1 MG/DL (ref 8.8–10.2)
CHLORIDE SERPL-SCNC: 105 MMOL/L (ref 98–107)
CHOLEST SERPL-MCNC: 162 MG/DL (ref 0–200)
CO2 SERPL-SCNC: 28 MMOL/L (ref 20–29)
CREAT SERPL-MCNC: 0.77 MG/DL (ref 0.6–1.1)
CREAT UR-MCNC: 35.2 MG/DL (ref 28–217)
GLOBULIN SER CALC-MCNC: 2.9 G/DL (ref 2.3–3.5)
GLUCOSE SERPL-MCNC: 110 MG/DL (ref 70–99)
HDLC SERPL-MCNC: 64 MG/DL (ref 40–60)
HDLC SERPL: 2.5 (ref 0–5)
LDLC SERPL CALC-MCNC: 81 MG/DL (ref 0–100)
MICROALBUMIN UR-MCNC: <1.2 MG/DL (ref 0–20)
MICROALBUMIN/CREAT UR-RTO: NORMAL MG/G (ref 0–30)
POTASSIUM SERPL-SCNC: 4.3 MMOL/L (ref 3.5–5.1)
PROT SERPL-MCNC: 6.6 G/DL (ref 6.3–8.2)
SODIUM SERPL-SCNC: 142 MMOL/L (ref 136–145)
T4 FREE SERPL-MCNC: 1.5 NG/DL (ref 0.9–1.7)
TRIGL SERPL-MCNC: 84 MG/DL (ref 0–150)
TSH W FREE THYROID IF ABNORMAL: 5.45 UIU/ML (ref 0.27–4.2)
VLDLC SERPL CALC-MCNC: 17 MG/DL (ref 6–23)

## 2025-02-10 PROCEDURE — 99214 OFFICE O/P EST MOD 30 MIN: CPT | Performed by: INTERNAL MEDICINE

## 2025-02-10 PROCEDURE — 95251 CONT GLUC MNTR ANALYSIS I&R: CPT | Performed by: INTERNAL MEDICINE

## 2025-02-10 PROCEDURE — 3074F SYST BP LT 130 MM HG: CPT | Performed by: INTERNAL MEDICINE

## 2025-02-10 PROCEDURE — 3078F DIAST BP <80 MM HG: CPT | Performed by: INTERNAL MEDICINE

## 2025-02-10 RX ORDER — ACYCLOVIR 400 MG/1
TABLET ORAL
Qty: 9 EACH | Refills: 3 | Status: SHIPPED | OUTPATIENT
Start: 2025-02-10

## 2025-02-10 RX ORDER — INSULIN GLARGINE-YFGN 100 [IU]/ML
80 INJECTION, SOLUTION SUBCUTANEOUS DAILY
Qty: 10 ML | Refills: 1
Start: 2025-02-10

## 2025-02-10 RX ORDER — INSULIN PUMP CARTRIDGE
CARTRIDGE (EA) SUBCUTANEOUS
Qty: 45 EACH | Refills: 3
Start: 2025-02-10

## 2025-02-10 RX ORDER — DAPAGLIFLOZIN 5 MG/1
5 TABLET, FILM COATED ORAL DAILY
Qty: 90 TABLET | Refills: 3 | Status: SHIPPED | OUTPATIENT
Start: 2025-02-10

## 2025-02-10 RX ORDER — SUBCUTANEOUS INSULIN PUMP
EACH MISCELLANEOUS
Qty: 1 EACH | Refills: 0
Start: 2025-02-10

## 2025-02-10 RX ORDER — INFUSION SET FOR INSULIN PUMP
INFUSION SETS-PARAPHERNALIA MISCELLANEOUS
Qty: 45 EACH | Refills: 3
Start: 2025-02-10

## 2025-02-10 RX ORDER — LEVOTHYROXINE SODIUM 200 UG/1
200 TABLET ORAL
Qty: 90 TABLET | Refills: 3 | Status: SHIPPED | OUTPATIENT
Start: 2025-02-10 | End: 2025-02-11 | Stop reason: SDUPTHER

## 2025-02-10 RX ORDER — IRBESARTAN 75 MG/1
75 TABLET ORAL DAILY
Qty: 90 TABLET | Refills: 3 | Status: SHIPPED | OUTPATIENT
Start: 2025-02-10

## 2025-02-10 RX ORDER — ATORVASTATIN CALCIUM 40 MG/1
40 TABLET, FILM COATED ORAL NIGHTLY
Qty: 90 TABLET | Refills: 3 | Status: SHIPPED | OUTPATIENT
Start: 2025-02-10 | End: 2025-02-11 | Stop reason: SDUPTHER

## 2025-02-10 RX ORDER — LEVOTHYROXINE SODIUM 25 UG/1
25 TABLET ORAL
Qty: 90 TABLET | Refills: 3 | Status: SHIPPED | OUTPATIENT
Start: 2025-02-10 | End: 2025-02-11 | Stop reason: SDUPTHER

## 2025-02-10 RX ORDER — INSULIN LISPRO 100 [IU]/ML
INJECTION, SOLUTION INTRAVENOUS; SUBCUTANEOUS
Qty: 180 ML | Refills: 3 | Status: SHIPPED | OUTPATIENT
Start: 2025-02-10

## 2025-02-10 ASSESSMENT — ENCOUNTER SYMPTOMS
DIARRHEA: 0
CONSTIPATION: 0

## 2025-02-10 NOTE — PROGRESS NOTES
cholesterol 215, triglycerides 120, HDL 61, , VLDL 21 (off atorvastatin).  10/24/2023: Total cholesterol 157, triglycerides 108, HDL 65, LDL 70.4, VLDL 21.6.  4/3/2024: Total cholesterol 174, triglycerides 77, HDL 82, LDL 78, VLDL 14.     Pump: Tandem t:slim with Control IQ started 12/2021.     Pump issues: None.     Pump settings: Total daily basal dose: 70.94 units.     Other treatment: Farxiga and Ozempic.          THYROID DISEASE     Presentation/Diagnosis: Hypothyroidism secondary to Hashimoto's thyroiditis diagnosed around 2010.     Symptoms: See review of systems.     Treatment: Takes generic in AM correctly.     Imaging: None.     Labs:  2/20/2017: TSH 5.780, total T4 8.1.  4/27/2017: TSH 5.460, total T4 11.3, T3 uptake 31, free thyroxine index 3.5.  9/8/2017: TSH 0.994, thyroid peroxidase antibodies 504.  10/30/2018: TSH 3.130.  4/23/2019: TSH 2.160.  8/9/2019: TSH 3.130.  10/29/2019: TSH 1.750.  5/8/2020: TSH 0.060, free T4 2.30.  8/4/2020: TSH 0.957, free T4 1.62.  6/4/2021: TSH 0.23, free T4 1.6, total T3 0.96.  8/6/2021: TSH 1.104.  4/7/2022: TSH 2.081, free T4 1.34.  11/7/2022: TSH 5.9, free T4 1.43.  1/11/2023: TSH 1.43.  10/24/2023: TSH 2.71.  4/3/2024: TSH 4.490, free T4 1.97.  4/10/2024: TSH 3.490, free T4 1.5.  10/24/2024: TSH 1.6.     Pregnancy status: Status post hysterectomy.      Review of Systems   Constitutional:  Positive for fatigue.        Weight increased 6 pounds.   Cardiovascular:  Negative for palpitations.   Gastrointestinal:  Negative for constipation and diarrhea.   Endocrine: Negative for cold intolerance.        She reports some occasional hot flashes and night sweats.   Neurological:  Negative for tremors.   Psychiatric/Behavioral:  Positive for sleep disturbance.        Vital Signs:  /78 (Site: Left Upper Arm, Position: Sitting)   Pulse 73   Ht 1.854 m (6' 1\")   Wt (!) 140.6 kg (310 lb)   SpO2 98%   BMI 40.90 kg/m²     Wt Readings from Last 3 Encounters:

## 2025-02-11 ENCOUNTER — TELEPHONE (OUTPATIENT)
Dept: ENDOCRINOLOGY | Age: 50
End: 2025-02-11

## 2025-02-11 DIAGNOSIS — E78.2 MIXED HYPERLIPIDEMIA: ICD-10-CM

## 2025-02-11 DIAGNOSIS — E03.9 PRIMARY HYPOTHYROIDISM: ICD-10-CM

## 2025-02-11 RX ORDER — INFUSION SET FOR INSULIN PUMP
INFUSION SETS-PARAPHERNALIA MISCELLANEOUS
Qty: 45 EACH | Refills: 2 | OUTPATIENT
Start: 2025-02-11

## 2025-02-11 RX ORDER — LEVOTHYROXINE SODIUM 25 UG/1
37.5 TABLET ORAL
Qty: 135 TABLET | Refills: 3 | Status: SHIPPED | OUTPATIENT
Start: 2025-02-11

## 2025-02-11 RX ORDER — ATORVASTATIN CALCIUM 80 MG/1
80 TABLET, FILM COATED ORAL NIGHTLY
Qty: 90 TABLET | Refills: 3 | Status: SHIPPED | OUTPATIENT
Start: 2025-02-11

## 2025-02-11 RX ORDER — LEVOTHYROXINE SODIUM 200 UG/1
200 TABLET ORAL
Qty: 90 TABLET | Refills: 3 | Status: SHIPPED | OUTPATIENT
Start: 2025-02-11

## 2025-02-11 NOTE — TELEPHONE ENCOUNTER
Attempt x2. LVM to contact clinic. Per last OV note methimazole 10 mg and 5 mg alternate days. Dr. Onesimo Souza would now like for pt to take 10mg daily. Orders placed for repeat labs in 2months (1/21/24) and new prescription sent to pharmacy with updated dose. Spoke to patient.  Reviewed message and dose changes to atorvastatin and levothyroxine, [facility], 1 yr total.    Pt repeated changes accurately, will continue on previous dose until new meds arrive.  She wcb w any changes or concerns.

## 2025-02-17 DIAGNOSIS — E10.65 UNCONTROLLED TYPE 1 DIABETES MELLITUS WITH HYPERGLYCEMIA (HCC): ICD-10-CM

## 2025-02-18 RX ORDER — INSULIN PUMP CARTRIDGE
CARTRIDGE (EA) SUBCUTANEOUS
Qty: 45 EACH | Refills: 5 | Status: SHIPPED | OUTPATIENT
Start: 2025-02-18

## 2025-02-18 RX ORDER — INFUSION SET FOR INSULIN PUMP
INFUSION SETS-PARAPHERNALIA MISCELLANEOUS
Qty: 45 EACH | Refills: 5 | Status: SHIPPED | OUTPATIENT
Start: 2025-02-18

## 2025-04-24 DIAGNOSIS — E03.9 PRIMARY HYPOTHYROIDISM: ICD-10-CM

## 2025-04-24 RX ORDER — LEVOTHYROXINE SODIUM 25 UG/1
37.5 TABLET ORAL
Qty: 135 TABLET | Refills: 3 | Status: SHIPPED | OUTPATIENT
Start: 2025-04-24 | End: 2025-04-25 | Stop reason: SDUPTHER

## 2025-04-24 NOTE — TELEPHONE ENCOUNTER
(Pt having the rest of her surgery 5/8/25.)  [facility] sent her the 1 tablet daily qty 90, 25mcg levothyroxine, not the updated 1.5 tablets that replaced it and told her they don't have the Rx for 37.5mcg, qty 135 tablets.     Please re-submit the 25mcg 1.5 tablets daily to [facility].

## 2025-04-25 DIAGNOSIS — E03.9 PRIMARY HYPOTHYROIDISM: ICD-10-CM

## 2025-04-25 RX ORDER — LEVOTHYROXINE SODIUM 25 UG/1
37.5 TABLET ORAL
Qty: 135 TABLET | Refills: 3 | Status: SHIPPED | OUTPATIENT
Start: 2025-04-25

## 2025-05-11 DIAGNOSIS — E10.65 UNCONTROLLED TYPE 1 DIABETES MELLITUS WITH HYPERGLYCEMIA (HCC): Primary | ICD-10-CM

## 2025-05-12 RX ORDER — BLOOD SUGAR DIAGNOSTIC
STRIP MISCELLANEOUS
Qty: 200 STRIP | Refills: 3 | Status: SHIPPED | OUTPATIENT
Start: 2025-05-12

## 2025-07-07 ENCOUNTER — OFFICE VISIT (OUTPATIENT)
Dept: ENDOCRINOLOGY | Age: 50
End: 2025-07-07
Payer: COMMERCIAL

## 2025-07-07 VITALS
WEIGHT: 293 LBS | HEIGHT: 72 IN | RESPIRATION RATE: 22 BRPM | OXYGEN SATURATION: 95 % | SYSTOLIC BLOOD PRESSURE: 126 MMHG | HEART RATE: 72 BPM | DIASTOLIC BLOOD PRESSURE: 68 MMHG | BODY MASS INDEX: 39.68 KG/M2

## 2025-07-07 DIAGNOSIS — E03.9 PRIMARY HYPOTHYROIDISM: ICD-10-CM

## 2025-07-07 DIAGNOSIS — E10.65 UNCONTROLLED TYPE 1 DIABETES MELLITUS WITH HYPERGLYCEMIA (HCC): Primary | ICD-10-CM

## 2025-07-07 DIAGNOSIS — Z96.41 INSULIN PUMP STATUS: ICD-10-CM

## 2025-07-07 DIAGNOSIS — I10 ESSENTIAL HYPERTENSION: ICD-10-CM

## 2025-07-07 DIAGNOSIS — E78.2 MIXED HYPERLIPIDEMIA: ICD-10-CM

## 2025-07-07 LAB
HBA1C MFR BLD: 7.5 %
T4 FREE SERPL-MCNC: 1.2 NG/DL (ref 0.9–1.7)
TSH W FREE THYROID IF ABNORMAL: 11.7 UIU/ML (ref 0.27–4.2)

## 2025-07-07 PROCEDURE — 3074F SYST BP LT 130 MM HG: CPT | Performed by: INTERNAL MEDICINE

## 2025-07-07 PROCEDURE — 95251 CONT GLUC MNTR ANALYSIS I&R: CPT | Performed by: INTERNAL MEDICINE

## 2025-07-07 PROCEDURE — 3051F HG A1C>EQUAL 7.0%<8.0%: CPT | Performed by: INTERNAL MEDICINE

## 2025-07-07 PROCEDURE — 3078F DIAST BP <80 MM HG: CPT | Performed by: INTERNAL MEDICINE

## 2025-07-07 PROCEDURE — 83036 HEMOGLOBIN GLYCOSYLATED A1C: CPT | Performed by: INTERNAL MEDICINE

## 2025-07-07 PROCEDURE — 99214 OFFICE O/P EST MOD 30 MIN: CPT | Performed by: INTERNAL MEDICINE

## 2025-07-07 RX ORDER — DAPAGLIFLOZIN 5 MG/1
5 TABLET, FILM COATED ORAL DAILY
Qty: 90 TABLET | Refills: 3 | Status: SHIPPED | OUTPATIENT
Start: 2025-07-07

## 2025-07-07 RX ORDER — ATORVASTATIN CALCIUM 80 MG/1
80 TABLET, FILM COATED ORAL NIGHTLY
Qty: 90 TABLET | Refills: 3 | Status: SHIPPED | OUTPATIENT
Start: 2025-07-07

## 2025-07-07 RX ORDER — INFUSION SET FOR INSULIN PUMP
INFUSION SETS-PARAPHERNALIA MISCELLANEOUS
Qty: 45 EACH | Refills: 5
Start: 2025-07-07

## 2025-07-07 RX ORDER — LEVOTHYROXINE SODIUM 25 UG/1
37.5 TABLET ORAL
Qty: 135 TABLET | Refills: 3 | Status: SHIPPED | OUTPATIENT
Start: 2025-07-07 | End: 2025-07-08 | Stop reason: SDUPTHER

## 2025-07-07 RX ORDER — INSULIN GLARGINE-YFGN 100 [IU]/ML
80 INJECTION, SOLUTION SUBCUTANEOUS DAILY
Qty: 10 ML | Refills: 1
Start: 2025-07-07

## 2025-07-07 RX ORDER — SUBCUTANEOUS INSULIN PUMP
EACH MISCELLANEOUS
Qty: 1 EACH | Refills: 0
Start: 2025-07-07

## 2025-07-07 RX ORDER — IRBESARTAN 75 MG/1
75 TABLET ORAL DAILY
Qty: 90 TABLET | Refills: 3 | Status: SHIPPED | OUTPATIENT
Start: 2025-07-07

## 2025-07-07 RX ORDER — INSULIN LISPRO 100 [IU]/ML
INJECTION, SOLUTION INTRAVENOUS; SUBCUTANEOUS
Qty: 180 ML | Refills: 3 | Status: SHIPPED | OUTPATIENT
Start: 2025-07-07

## 2025-07-07 RX ORDER — ACYCLOVIR 400 MG/1
TABLET ORAL
Qty: 9 EACH | Refills: 3 | Status: SHIPPED | OUTPATIENT
Start: 2025-07-07

## 2025-07-07 RX ORDER — FERROUS SULFATE 325(65) MG
325 TABLET ORAL
COMMUNITY

## 2025-07-07 RX ORDER — INSULIN PUMP CARTRIDGE
CARTRIDGE (EA) SUBCUTANEOUS
Qty: 45 EACH | Refills: 5
Start: 2025-07-07

## 2025-07-07 RX ORDER — GABAPENTIN 300 MG/1
300 CAPSULE ORAL EVERY 8 HOURS
COMMUNITY
Start: 2025-05-22

## 2025-07-07 RX ORDER — LEVOTHYROXINE SODIUM 200 UG/1
200 TABLET ORAL
Qty: 90 TABLET | Refills: 3 | Status: SHIPPED | OUTPATIENT
Start: 2025-07-07 | End: 2025-07-08 | Stop reason: SDUPTHER

## 2025-07-07 ASSESSMENT — ENCOUNTER SYMPTOMS
DIARRHEA: 0
CONSTIPATION: 0

## 2025-07-07 NOTE — PROGRESS NOTES
sweats.   Neurological:  Negative for tremors.   Psychiatric/Behavioral:  Positive for sleep disturbance.        Vital Signs:  /68 (BP Site: Left Lower Arm, Patient Position: Sitting, BP Cuff Size: Medium Adult)   Pulse 72   Resp 22   Ht 1.854 m (6' 1\")   Wt (!) 155.1 kg (342 lb)   SpO2 95%   BMI 45.12 kg/m²     Wt Readings from Last 3 Encounters:   07/07/25 (!) 155.1 kg (342 lb)   02/10/25 (!) 140.6 kg (310 lb)   09/19/24 (!) 137.9 kg (304 lb)       Physical Exam  Constitutional:       General: She is not in acute distress.  Neck:      Thyroid: No thyroid mass or thyromegaly.   Cardiovascular:      Rate and Rhythm: Normal rate and regular rhythm.      Comments: DP pulses 2+ bilaterally.  No edema.  Lymphadenopathy:      Cervical: No cervical adenopathy.   Skin:     Comments: Calluses on bilateral 1st toes medially without ulcers.      Neurological:      Motor: No tremor.      Comments: Monofilament decreased.  Ankle reflexes absent.         Orders Placed This Encounter   Procedures    GLUCOSE MONITOR, 72 HOUR, PHYS INTERP    AMB POC HEMOGLOBIN A1C       Current Outpatient Medications   Medication Sig Dispense Refill    ferrous sulfate (IRON 325) 325 (65 Fe) MG tablet Take 1 tablet by mouth daily (with breakfast)      gabapentin (NEURONTIN) 300 MG capsule Take 1 capsule by mouth in the morning and 1 capsule at noon and 1 capsule in the evening.      blood glucose test strips (ACCU-CHEK MARIBELL PLUS) strip Check blood glucose twice a day 200 strip 3    venlafaxine (EFFEXOR) 75 MG tablet Take 1 tablet by mouth daily      Calcium Carb-Cholecalciferol (CALCIUM 600 + D PO) Take by mouth      coenzyme Q10 100 MG CAPS capsule Take by mouth      MAGNESIUM-OXIDE 400 (240 Mg) MG tablet       potassium chloride (KLOR-CON M) 20 MEQ extended release tablet       Turmeric, Curcuma Longa, (CURCUMIN) POWD by Does not apply route      Multiple Vitamins-Minerals (HAIR SKIN AND NAILS FORMULA PO) Take by mouth      Multiple

## 2025-07-08 ENCOUNTER — RESULTS FOLLOW-UP (OUTPATIENT)
Dept: ENDOCRINOLOGY | Age: 50
End: 2025-07-08

## 2025-07-08 DIAGNOSIS — E03.9 PRIMARY HYPOTHYROIDISM: ICD-10-CM

## 2025-07-08 RX ORDER — LEVOTHYROXINE SODIUM 200 UG/1
200 TABLET ORAL
Qty: 90 TABLET | Refills: 3 | Status: SHIPPED | OUTPATIENT
Start: 2025-07-08

## 2025-07-08 RX ORDER — LEVOTHYROXINE SODIUM 50 UG/1
50 TABLET ORAL
Qty: 90 TABLET | Refills: 3 | Status: SHIPPED | OUTPATIENT
Start: 2025-07-08

## 2025-07-10 NOTE — TELEPHONE ENCOUNTER
Tried to call pt, phone picked up but no prompt that vm was open.    Sending [employer] message

## 2025-08-09 ENCOUNTER — OFFICE VISIT (OUTPATIENT)
Dept: SLEEP MEDICINE | Age: 50
End: 2025-08-09
Payer: COMMERCIAL

## 2025-08-09 VITALS
OXYGEN SATURATION: 94 % | RESPIRATION RATE: 16 BRPM | HEART RATE: 98 BPM | SYSTOLIC BLOOD PRESSURE: 147 MMHG | BODY MASS INDEX: 47.09 KG/M2 | TEMPERATURE: 98.1 F | DIASTOLIC BLOOD PRESSURE: 78 MMHG | WEIGHT: 293 LBS | HEIGHT: 66 IN

## 2025-08-09 DIAGNOSIS — G47.33 OSA (OBSTRUCTIVE SLEEP APNEA): Primary | ICD-10-CM

## 2025-08-09 DIAGNOSIS — G47.00 INSOMNIA, UNSPECIFIED TYPE: ICD-10-CM

## 2025-08-09 DIAGNOSIS — E66.813 CLASS 3 SEVERE OBESITY DUE TO EXCESS CALORIES WITH SERIOUS COMORBIDITY AND BODY MASS INDEX (BMI) OF 45.0 TO 49.9 IN ADULT (HCC): ICD-10-CM

## 2025-08-09 PROCEDURE — 99213 OFFICE O/P EST LOW 20 MIN: CPT | Performed by: NURSE PRACTITIONER

## 2025-08-09 PROCEDURE — 3077F SYST BP >= 140 MM HG: CPT | Performed by: NURSE PRACTITIONER

## 2025-08-09 PROCEDURE — 3078F DIAST BP <80 MM HG: CPT | Performed by: NURSE PRACTITIONER

## 2025-08-09 ASSESSMENT — SLEEP AND FATIGUE QUESTIONNAIRES
HOW LIKELY ARE YOU TO NOD OFF OR FALL ASLEEP WHILE LYING DOWN TO REST IN THE AFTERNOON WHEN CIRCUMSTANCES PERMIT: HIGH CHANCE OF DOZING
HOW LIKELY ARE YOU TO NOD OFF OR FALL ASLEEP IN A CAR, WHILE STOPPED FOR A FEW MINUTES IN TRAFFIC: WOULD NEVER DOZE
HOW LIKELY ARE YOU TO NOD OFF OR FALL ASLEEP WHEN YOU ARE A PASSENGER IN A CAR FOR AN HOUR WITHOUT A BREAK: WOULD NEVER DOZE
HOW LIKELY ARE YOU TO NOD OFF OR FALL ASLEEP WHILE SITTING AND TALKING TO SOMEONE: WOULD NEVER DOZE
HOW LIKELY ARE YOU TO NOD OFF OR FALL ASLEEP WHILE SITTING QUIETLY AFTER LUNCH WITHOUT ALCOHOL: WOULD NEVER DOZE
ESS TOTAL SCORE: 5
HOW LIKELY ARE YOU TO NOD OFF OR FALL ASLEEP WHILE SITTING INACTIVE IN A PUBLIC PLACE: WOULD NEVER DOZE
HOW LIKELY ARE YOU TO NOD OFF OR FALL ASLEEP WHILE SITTING AND READING: SLIGHT CHANCE OF DOZING
HOW LIKELY ARE YOU TO NOD OFF OR FALL ASLEEP WHILE WATCHING TV: SLIGHT CHANCE OF DOZING

## 2025-08-19 ENCOUNTER — PATIENT MESSAGE (OUTPATIENT)
Dept: SLEEP MEDICINE | Age: 50
End: 2025-08-19